# Patient Record
Sex: MALE
[De-identification: names, ages, dates, MRNs, and addresses within clinical notes are randomized per-mention and may not be internally consistent; named-entity substitution may affect disease eponyms.]

---

## 2018-12-10 ENCOUNTER — HOSPITAL ENCOUNTER (INPATIENT)
Dept: HOSPITAL 88 - FSED | Age: 78
LOS: 7 days | Discharge: HOME HEALTH | DRG: 292 | End: 2018-12-17
Attending: INTERNAL MEDICINE | Admitting: INTERNAL MEDICINE
Payer: MEDICARE

## 2018-12-10 VITALS — WEIGHT: 173 LBS | BODY MASS INDEX: 33.96 KG/M2 | HEIGHT: 60 IN

## 2018-12-10 VITALS — DIASTOLIC BLOOD PRESSURE: 88 MMHG | SYSTOLIC BLOOD PRESSURE: 136 MMHG

## 2018-12-10 VITALS — SYSTOLIC BLOOD PRESSURE: 136 MMHG | DIASTOLIC BLOOD PRESSURE: 88 MMHG

## 2018-12-10 VITALS — DIASTOLIC BLOOD PRESSURE: 65 MMHG | SYSTOLIC BLOOD PRESSURE: 136 MMHG

## 2018-12-10 DIAGNOSIS — K59.00: ICD-10-CM

## 2018-12-10 DIAGNOSIS — Z95.810: ICD-10-CM

## 2018-12-10 DIAGNOSIS — E11.9: ICD-10-CM

## 2018-12-10 DIAGNOSIS — Z79.01: ICD-10-CM

## 2018-12-10 DIAGNOSIS — I48.2: ICD-10-CM

## 2018-12-10 DIAGNOSIS — I48.91: ICD-10-CM

## 2018-12-10 DIAGNOSIS — I50.23: ICD-10-CM

## 2018-12-10 DIAGNOSIS — J44.1: ICD-10-CM

## 2018-12-10 DIAGNOSIS — J96.11: ICD-10-CM

## 2018-12-10 DIAGNOSIS — J11.1: ICD-10-CM

## 2018-12-10 DIAGNOSIS — D50.9: ICD-10-CM

## 2018-12-10 DIAGNOSIS — Z87.891: ICD-10-CM

## 2018-12-10 DIAGNOSIS — E87.6: ICD-10-CM

## 2018-12-10 DIAGNOSIS — Z91.14: ICD-10-CM

## 2018-12-10 DIAGNOSIS — I11.0: Primary | ICD-10-CM

## 2018-12-10 DIAGNOSIS — F10.21: ICD-10-CM

## 2018-12-10 DIAGNOSIS — N40.0: ICD-10-CM

## 2018-12-10 LAB
CK MB SERPL-MCNC: 4.6 NG/ML (ref 0–5)
CK SERPL-CCNC: 109 IU/L (ref 30–200)

## 2018-12-10 PROCEDURE — 93306 TTE W/DOPPLER COMPLETE: CPT

## 2018-12-10 PROCEDURE — 83540 ASSAY OF IRON: CPT

## 2018-12-10 PROCEDURE — 84466 ASSAY OF TRANSFERRIN: CPT

## 2018-12-10 PROCEDURE — 82553 CREATINE MB FRACTION: CPT

## 2018-12-10 PROCEDURE — 71046 X-RAY EXAM CHEST 2 VIEWS: CPT

## 2018-12-10 PROCEDURE — 94760 N-INVAS EAR/PLS OXIMETRY 1: CPT

## 2018-12-10 PROCEDURE — 84484 ASSAY OF TROPONIN QUANT: CPT

## 2018-12-10 PROCEDURE — 80076 HEPATIC FUNCTION PANEL: CPT

## 2018-12-10 PROCEDURE — 93005 ELECTROCARDIOGRAM TRACING: CPT

## 2018-12-10 PROCEDURE — 87400 INFLUENZA A/B EACH AG IA: CPT

## 2018-12-10 PROCEDURE — 36415 COLL VENOUS BLD VENIPUNCTURE: CPT

## 2018-12-10 PROCEDURE — 85379 FIBRIN DEGRADATION QUANT: CPT

## 2018-12-10 PROCEDURE — 80061 LIPID PANEL: CPT

## 2018-12-10 PROCEDURE — 80048 BASIC METABOLIC PNL TOTAL CA: CPT

## 2018-12-10 PROCEDURE — 83880 ASSAY OF NATRIURETIC PEPTIDE: CPT

## 2018-12-10 PROCEDURE — 80053 COMPREHEN METABOLIC PANEL: CPT

## 2018-12-10 PROCEDURE — 83735 ASSAY OF MAGNESIUM: CPT

## 2018-12-10 PROCEDURE — 83036 HEMOGLOBIN GLYCOSYLATED A1C: CPT

## 2018-12-10 PROCEDURE — 90732 PPSV23 VACC 2 YRS+ SUBQ/IM: CPT

## 2018-12-10 PROCEDURE — 82948 REAGENT STRIP/BLOOD GLUCOSE: CPT

## 2018-12-10 PROCEDURE — 85025 COMPLETE CBC W/AUTO DIFF WBC: CPT

## 2018-12-10 PROCEDURE — 71045 X-RAY EXAM CHEST 1 VIEW: CPT

## 2018-12-10 PROCEDURE — 82550 ASSAY OF CK (CPK): CPT

## 2018-12-10 PROCEDURE — 99284 EMERGENCY DEPT VISIT MOD MDM: CPT

## 2018-12-10 NOTE — XMS REPORT
Clinical Summary

                             Created on: 12/10/2018



Jerry Neves

External Reference #: [id number]

: 1940

Sex: Male



Demographics







                          Address                   [address]

[place]  [address]

 

                          Home Phone                [phone]

 

                          Preferred Language        English

 

                          Marital Status            Unknown

 

                          Buddhism Affiliation     Alevism

 

                          Race                      Unknown

 

                          Ethnic Group              /Latin





Author







                          Author                    [facility]

 

                          Address                   Unknown

 

                          Phone                     Unavailable







Support







                Name            Relationship    Address         Phone

 

                Giovana,Prima    ECON            [place],       [phone]







Care Team Providers







                    Care Team Member Name    Role                Phone

 

                          PCP                       Unavailable







Allergies

No Known Allergies



Medications







                          End Date                  Status



              Medication     Sig          Dispensed     Refills      Start  



                                         Date  

 

                                                    Active



                     atorvastatin (LIPITOR) 20     Take 20 mg by       0   



                           MG tablet                 mouth daily.     

 

                                                    Active



                     carvedilol (COREG) 25 MG     Take 25 mg by       0   



                           tablet                    mouth 2 (two)     



                                         times daily     



                                         with     



                                         breakfast and     



                                         dinner.     

 

                                                    Active



                     amiodarone (PACERONE) 200     Take 100 mg         0   



                           MG tablet                 by mouth     



                                         daily .     

 

                                                    Active



                     ramipril (ALTACE) 2.5 MG     Take 10 mg by       0   



                           capsule                   mouth daily .     

 

                                                    Active



                     clopidogrel (PLAVIX) 75     Take 75 mg by       0   



                           mg tablet                 mouth daily.     

 

                                                    Active



                     gabapentin (NEURONTIN)     Take 300 mg         0   



                           300 MG capsule            by mouth     



                                         nightly.     

 

                                                    Active



                     metformin HCl (METFORMIN     Take 500 mg         0   



                           ORAL)                     by mouth     



                                         every     



                                         morning.     

 

                                                    Active



                     furosemide (LASIX) 20 MG     Take 20 mg by       0   



                           tablet                    mouth every     



                                         other day.     

 

                                                    Active



                     amLODIPine (NORVASC) 2.5     Take 2.5 mg         0   



                           MG tablet                 by mouth     



                                         daily.     

 

                                                    Active



                     tamsulosin (FLOMAX) 0.4     Take 0.4 mg         0   



                           mg Cp24 24 hr capsule     by mouth     



                                         nightly.     

 

                                                    Active



                     ergocalciferol (VITAMIN     Take 50,000         0   



                           D2) 50,000 unit capsule     Units by     



                                         mouth once a     



                                         week.     

 

                                                    Active



                     alendronate (FOSAMAX) 70     Take 70 mg by       0   



                           MG tablet                 mouth every 7     



                                         days Take in     



                                         the morning     



                                         with a full     



                                         glass of     



                                         water, on an     



                                         empty     



                                         stomach, and     



                                         do not take     



                                         anything else     



                                         by mouth or     



                                         lie down for     



                                         the next 30     



                                         min. .     

 

                                                    Active



                     BABY ASPIRIN ORAL     Take by mouth       0   



                                         daily.     

 

                          06/15/2018                Discontinued



                     cilostazol (PLETAL) 100     Take 100 mg         0   



                           MG tablet                 by mouth 2     



                                         (two) times     



                                         daily.     

 

                          06/15/2018                Discontinued



                     spironolactone      Take 25 mg by       0   



                           (ALDACTONE) 25 MG tablet     mouth daily.     

 

                          06/15/2018                Discontinued



                     warfarin (COUMADIN) 2 MG     Take 2 mg by        0   



                           tablet                    mouth daily.     







Active Problems







 



                           Problem                   Noted Date

 

 



                           Claudication              06/15/2018

 

 



                           Abnormal stress test      06/15/2018

 

 



                           PAD (peripheral artery disease)     06/15/2018

 

 



                           Pneumonia                 2013

 

 



                                         Hypertension 

 

 



                                         CHF (congestive heart failure) 

 

 



                                         Mixed hyperlipidemia 

 

 



                                         CAD (coronary artery disease) 

 

 



                                         PVD (peripheral vascular disease) 

 

 



                                         Diabetes mellitus 

 

 



                                         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 

 

 



                                         Overview:



                                         Updated by SNOMED/IMO







Encounters







                          Care Team                 Description



                     Date                Type                Specialty  

 

                                        



Santos Li MD                     L CATH & CORONARY ANGIOS



                           06/15/2018                Surgery   

 

                                        



Santos Li MD                     Claudication (HCC) (Primary Dx); 

Abnormal stress test; 

Coronary artery disease due to lipid rich plaque



                     06/15/2018          Hospital            Cardiology  



                           -                         Encounter   



                                         2018    



after 2017



Social History







                                        Date



                 Tobacco Use     Types           Packs/Day       Years Used 

 

                                        Quit: 2018



                           Former Smoker             0.25  

 

    



                                         Smokeless Tobacco: Never   



                                         Used   









                                        Tobacco Cessation: Ready to Quit: No; Counseling Given: Yes











   



                 Alcohol Use     Drinks/Week     oz/Week         Comments

 

   



                                         No   









 



                           Sex Assigned at Birth     Date Recorded

 

 



                                         Not on file 









                                        Industry



                           Job Start Date            Occupation 

 

                                        Not on file



                           Not on file               Not on file 









                                        Travel End



                           Travel History            Travel Start 

 





                                         No recent travel history available.







Last Filed Vital Signs







                                        Time Taken



                           Vital Sign                Reading 

 

                                        2018  7:20 AM CDT



                           Blood Pressure            141/63 

 

                                        2018  7:20 AM CDT



                           Pulse                     60 

 

                                        2018  7:20 AM CDT



                           Temperature               36.7 C (98 F) 

 

                                        2018  7:20 AM CDT



                           Respiratory Rate          20 

 

                                        2018  7:20 AM CDT



                           Oxygen Saturation         97% 

 

                                        -



                           Inhaled Oxygen            - 



                                         Concentration  

 

                                        2018  7:20 AM CDT



                           Weight                    77.6 kg (171 lb) 

 

                                        06/15/2018  7:00 AM CDT



                           Height                    172.7 cm (5' 8") 

 

                                        2018  7:20 AM CDT



                           Body Mass Index           26 







Plan of Treatment





Not on file



Implants







                    Device Identifier    Shelf Expiration Date    Model / Serial / Lot



                 Implanted       Type            Area            Manufactur   



                                         er   

 

                    [id number]      2019          [id number] /

 /

                                        [id number]



                 Synergy         Stents-Cor      N/A: Coronary     [place]   



                     Implanted: Qty: 1 on 06/15/2018 by     onary               Santos Jdae MD      

 

                    [id number]      2019          [id number] /

 /

                                        [id number]



                 Promus Premier     Stents-Cor      N/A: Coronary     [place]   



                     Implanted: Qty: 1 on 06/15/2018 by     Santos Mahoney MD      







Procedures







                                        Comments



                 Procedure Name     Priority        Date/Time       Associated Diagnosis 

 

                                         



                           VASCULAR DIAGRAM -SCAN      2018  



                                         12:00 PM CDT  

 

                                         



                           VASCULAR DIAGRAM -SCAN      2018  



                                         12:00 PM CDT  

 

                                         



                           CARDIAC CATH REPORT -      2018  



                           SCAN                      10:12 AM CDT  

 

                                         



                           RHYTHM STRIP - SCAN       2018  



                                         12:01 PM CDT  

 

                                        



Results for this procedure are in the results section.



                     CBC (HEMOGRAM ONLY)     Routine             2018  



                                         3:10 AM CDT  

 

                                        



Results for this procedure are in the results section.



                     BASIC METABOLIC PANEL (7)     Routine             2018  



                                         3:10 AM CDT  

 

                                        



Results for this procedure are in the results section.



                     POCT-ACT            Routine             06/15/2018  



                                         5:46 PM CDT  

 

                                        



Results for this procedure are in the results section.



                     POCT-ACT            Routine             06/15/2018  



                                         3:08 PM CDT  

 

                                        



Results for this procedure are in the results section.



                     POCT-ACT            Routine             06/15/2018  



                                         2:39 PM CDT  

 

                                        



Results for this procedure are in the results section.



                     POCT-ACT            Routine             06/15/2018  



                                         2:02 PM CDT  

 

                                         



                     ABD AO & LOWER EXT      06/15/2018          Peripheral vascular 



                     ANGIOS/ POSS PPI      1:22 PM CDT         disease (HCC) 



                                         CAD in native artery 

 

  



                                         Case Notes



                                         4CASE



                                         POP6  Left



                                         heart cath



                                         possiblelo



                                         wer



                                         extremity



                                         angio with



                                         possible



                                         ppi

 

                                         



                     L CATH & CORONARY ANGIOS      06/15/2018          Peripheral vascular 



                           1:22 PM CDT               disease (HCC) 



                                         CAD in native artery 

 

  



                                         Case Notes



                                         4CASE



                                         POP6  Left



                                         heart cath



                                         possiblelo



                                         wer



                                         extremity



                                         angio with



                                         possible



                                         ppi

 

                                        



Results for this procedure are in the results section.



                     POCT-GLUCOSE METER     Routine             06/15/2018  



                                         8:18 AM CDT  



after 2017



Results

* VASCULAR DIAGRAM -SCAN (2018 12:00 PM CDT)



Only the most recent of 2 results within the time period is included.





 



                           Narrative                 Performed At

 

 



                                         This result has an attachment that is not available. 





* CARDIAC CATH REPORT - SCAN (2018 10:12 AM CDT)





 



                           Narrative                 Performed At

 

 



                                         This result has an attachment that is not available. 





* RHYTHM STRIP - SCAN (2018 12:01 PM CDT)





 



                           Narrative                 Performed At

 

 



                                         This result has an attachment that is not available. 





* CBC (Hemogram only) (2018  3:10 AM CDT)





   



                 Component       Value           Ref Range       Performed At

 

   



                 WBC             16.8 (H)        3.5 - 10.5 K/L     [facility]

 

   



                 RBC             3.92 (L)        4.63 - 6.08 M/L     [facility]

 

   



                 Hemoglobin      12.4 (L)        13.7 - 17.5 GM/DL     [facility]

 

   



                 Hematocrit      37.7 (L)        40.1 - 51.0 %     [facility]

 

   



                 MCV             96.2 (H)        79.0 - 92.2 fL     [facility]

 

   



                 MCH             31.6            25.7 - 32.2 pg     [facility]

 

   



                 MCHC            32.9            32.3 - 36.5 GM/DL     [facility]

 

   



                 RDW             14.6 (H)        11.6 - 14.4 %     [facility]

 

   



                 Platelets       281             150 - 450 K/CU MM     [facility]

 

   



                 MPV             11.2            9.4 - 12.4 fL     [facility]

 

   



                 nRBC            0               0 - 0 /100 WBC     [facility]













                                         Specimen

 





                                         Blood









   



                 Performing Organization     Address         City/State/Zipcode     Phone Number

 

   



                 [facility]     [address] [place] [address] [phone]





                                         MED[facility] CENTER   





* Basic metabolic panel (2018  3:10 AM CDT)





   



                 Component       Value           Ref Range       Performed At

 

   



                 Sodium          139             136 - 145 meq/L     [facility]

 

   



                 Potassium       4.5             3.5 - 5.1 meq/L     [facility]

 

   



                 Chloride        108 (H)         98 - 107 meq/L     [facility]

 

   



                 CO2             19 (L)          22 - 29 meq/L     [facility]

 

   



                 BUN             23 (H)          7 - 21 mg/dL     [facility]

 

   



                 Creatinine      1.10            0.57 - 1.25 mg/dL     [facility]

 

   



                 Glucose         176 (H)         70 - 105 mg/dL     [facility]

 

   



                 Calcium         9.0             8.4 - 10.2 mg/dL     [facility]

 

   



                 EGFR            65Comment: ESTIMATED GFR IS     mL/min/1.73 sq m     [facility]



                           NOT AS ACCURATE AS CREATININE      [facility]



                                         CLEARANCE IN PREDICTING  



                                         GLOMERULAR FILTRATION RATE.  



                                         ESTIMATED GFR IS NOT  



                                         APPLICABLE FOR DIALYSIS  



                                         PATIENTS.  













                                         Specimen

 





                                         Blood









   



                 Performing Organization     Address         City[facility]     Phone Number

 

   



                 [place] [address]     [phone] [place] [address]   





* POC ACTIVATED CLOTTING TIME (06/15/2018  5:46 PM CDT)



Only the most recent of 4 results within the time period is included.





   



                 Component       Value           Ref Range       Performed At

 

   



                 Activated Clotting Time     164Comment: TESTED AT [facility]     sec             [address]













                                         Specimen

 





                                         Blood









   



                 Performing Organization     Address         City/[facility]     Phone Number

 

   



                 [address] [address]     [phone] [place] [address]   





* POC-Glucose meter (06/15/2018  8:18 AM CDT)





   



                 Component       Value           Ref Range       Performed At

 

   



                 POC-Glucose Meter     118 (H)Comment: TESTED AT     70 - 110 mg/dL     [address]



                                         [id number]  













                                         Specimen

 





                                         Blood









   



                 Performing Organization     Address         City/[facility] Cent[facility]     Phone Number

 

   



                 [address] [phone]   





after 2017



Insurance







     



            Payer      Benefit     Subscriber ID     Type       Phone      Address



                                         Plan /    



                                         Group    

 

     



                     [facility]   



                                         MEDIC[facility]    



                                         ADV    









     



            Guarantor Name     Account     Relation to     Date of     Phone      Billing Address



                     Type                Patient             Birth  

 

     



            Jerry Neves     Personal/F     Self       1940     [phone] [address]



                     [facility]               (Home)              [place] [address]







Advance Directives





For more information, please contact:



[address] [address]



[phone]









                          Date Inactivated          Comments



                           Code Status               Date Activated  

 

                          2018 12:37 PM         



                           Full Code                 6/15/2018  7:55 AM  









  



                           This code status was determined by:     Patient 









                                                     

 

                          7/15/2013  4:53 PM        All possible means of support, including: cardiac massage,

 mechanical ventilation, and defibrillation will be used to support life.





                           Code ONE                  2013  1:11 AM

## 2018-12-10 NOTE — XMS REPORT
Patient Summary Document

                             Created on: 12/10/2018



JANEY BUSBY

External Reference #: [id number]

: 1940

Sex: Male



Demographics







                          Address                   [address]

[place]  [address]

 

                          Home Phone                [phone]

 

                          Preferred Language        Unknown

 

                          Marital Status            Unknown

 

                          Judaism Affiliation     Unknown

 

                          Race                      Unknown

 

                          Ethnic Group              Unknown





Author







                          Author                    [facility]

 

                          [facility]

 

                          Address                   Unknown

 

                          Phone                     Unavailable







Support







                Name            Relationship    Address         Phone

 

                    SARAH BUSBY      PRS                 [address], [place]  [id number] [phone]

 

                    SARAH BUSBY      PRS                 [address], [place]  [address] [phone]







Care Team Providers







                    Care Team Member Name    Role                Phone

 

                    DANILO SPRAGUE    Unavailable         Unavailable







Payers







             Payer Name    Policy Type    Policy Number    Effective Date    Expiration Date







Problems

This patient has no known problems.



Allergies, Adverse Reactions, Alerts







          Allergy Name    Allergy Type    Status    Severity    Reaction(s)    Onset Date    Inactive 

Date                      Treating Clinician        Comments

 

        No Known Allergies    DA      Active    U               2018-10-17 00:00:00                     

 

        NO KNOWN CONTRAST MEDIA ALLERG    DA      Active    U               2005 00:00:00                     

 

        NO KNOWN OTHER ALLERGIES    DA      Active    U               2005 00:00:00                     

 

        No Known Drug Allergies    DA      Active    U               2005 00:00:00                     

 

        No Known Food Allergies    DA      Active    U               2005 00:00:00                     

 

        No Known Drug Intolerances    DA      Active    U               2004 00:00:00                     







Medications

This patient has no known medications.



Results







           Test Description    Test Time    Test Comments    Text Results    Atomic Results    Result

 Comments

 

                BASIC METABOLIC PANEL    2018 03:57:00                      

 

   

 

                SODIUM (BEAKER) (test code=381)    139 meq/L       136-145          

 

                POTASSIUM (BEAKER) (test code=379)    4.5 meq/L       3.5-5.1          

 

                CHLORIDE (BEAKER) (test code=382)    108 meq/L                  

 

                CO2 (BEAKER) (test code=355)    19 meq/L        22-29            

 

                BLOOD UREA NITROGEN (BEAKER) (test code=354)    23 mg/dL        7-21             

 

                CREATININE (BEAKER) (test code=358)    1.10 mg/dL      0.57-1.25        

 

                GLUCOSE RANDOM (BEAKER) (test code=652)    176 mg/dL                  

 

                CALCIUM (BEAKER) (test code=697)    9.0 mg/dL       8.4-10.2         

 

                EGFR (BEAKER) (test code=1092)    65 mL/min/1.73 sq m                    ESTIMATED GFR IS NOT AS 

ACCURATE AS CREATININE CLEARANCE IN PREDICTING GLOMERULAR FILTRATION RATE. 
ESTIMATED GFR IS NOT APPLICABLE FOR DIALYSIS PATIENTS.





CBC (HEMOGRAM ONLY)2018 03:41:00* 





                Test Item       Value           Reference Range    Comments

 

                WHITE BLOOD CELL COUNT (BEAKER) (test code=775)    16.8 K/ L       3.5-10.5         

 

                RED BLOOD CELL COUNT (BEAKER) (test code=761)    3.92 M/ L       4.63-6.08        

 

                HEMOGLOBIN (BEAKER) (test code=410)    12.4 GM/DL      13.7-17.5        

 

                HEMATOCRIT (BEAKER) (test code=411)    37.7 %          40.1-51.0        

 

                MEAN CORPUSCULAR VOLUME (BEAKER) (test code=753)    96.2 fL         79.0-92.2        

 

                MEAN CORPUSCULAR HEMOGLOBIN (BEAKER) (test code=751)    31.6 pg         25.7-32.2        

 

                    MEAN CORPUSCULAR HEMOGLOBIN CONC (BEAKER) (test code=752)    32.9 GM/DL          32.3-36.5

                                         

 

                RED CELL DISTRIBUTION WIDTH (BEAKER) (test code=412)    14.6 %          11.6-14.4        

 

                PLATELET COUNT (BEAKER) (test code=756)    281 K/CU MM     150-450          

 

                MEAN PLATELET VOLUME (BEAKER) (test code=754)    11.2 fL         9.4-12.4         

 

                NUCLEATED RED BLOOD CELLS (BEAKER) (test code=413)    0 /100 WBC      0-0              





[id number] 17:52:00* 





                Test Item       Value           Reference Range    Comments

 

                ACTIVATED CLOTTING TIME (BEAKER) (test code=441)    164 sec                         TESTED AT [place] [id number]

 [facility] [address]





[id number] 15:26:00* 





                Test Item       Value           Reference Range    Comments

 

                ACTIVATED CLOTTING TIME (BEAKER) (test code=441)    235 sec                         TESTED AT [address] [address]





[id number] 15:26:00* 





                Test Item       Value           Reference Range    Comments

 

                ACTIVATED CLOTTING TIME (BEAKER) (test code=441)    213 sec                         TESTED AT [address] [address]





[id number] 15:25:00* 





                Test Item       Value           Reference Range    Comments

 

                ACTIVATED CLOTTING TIME (BEAKER) (test code=441)    279 sec                         TESTED AT [address] [address]





[id number]-[id number] METER2018-06-15 08:20:00* 





                Test Item       Value           Reference Range    Comments

 

                POC-GLUCOSE METER (AKER) (test code=1538)    118 mg/dL                 TESTED AT [address] [address]

## 2018-12-10 NOTE — XMS REPORT
Clinical Summary

                             Created on: 12/10/2018



Jerry Neves

External Reference #: [id number]

: 1940

Sex: Male



Demographics







                          Address                   [address]

[place]  [address]

 

                          Home Phone                [phone]

 

                          Preferred Language        English

 

                          Marital Status            Unknown

 

                          Moravian Affiliation     Adventism

 

                          Race                      Unknown

 

                          Ethnic Group              /Latin





Author







                          Author                    [facility]

 

                          Address                   Unknown

 

                          Phone                     Unavailable







Support







                Name            Relationship    Address         Phone

 

                Giovana,Prima    [employer]            [place],       [phone]







Care Team Providers







                    Care Team Member Name    Role                Phone

 

                          PCP                       Unavailable







Allergies

No Known Allergies



Medications







                          End Date                  Status



              Medication     Sig          Dispensed     Refills      Start  



                                         Date  

 

                                                    Active



                     atorvastatin (LIPITOR) 20     Take 20 mg by       0   



                           MG tablet                 mouth daily.     

 

                                                    Active



                     carvedilol (COREG) 25 MG     Take 25 mg by       0   



                           tablet                    mouth 2 (two)     



                                         times daily     



                                         with     



                                         breakfast and     



                                         dinner.     

 

                                                    Active



                     amiodarone (PACERONE) 200     Take 100 mg         0   



                           MG tablet                 by mouth     



                                         daily .     

 

                                                    Active



                     ramipril (ALTACE) 2.5 MG     Take 10 mg by       0   



                           capsule                   mouth daily .     

 

                                                    Active



                     clopidogrel (PLAVIX) 75     Take 75 mg by       0   



                           mg tablet                 mouth daily.     

 

                                                    Active



                     gabapentin (NEURONTIN)     Take 300 mg         0   



                           300 MG capsule            by mouth     



                                         nightly.     

 

                                                    Active



                     metformin HCl (METFORMIN     Take 500 mg         0   



                           ORAL)                     by mouth     



                                         every     



                                         morning.     

 

                                                    Active



                     furosemide (LASIX) 20 MG     Take 20 mg by       0   



                           tablet                    mouth every     



                                         other day.     

 

                                                    Active



                     amLODIPine (NORVASC) 2.5     Take 2.5 mg         0   



                           MG tablet                 by mouth     



                                         daily.     

 

                                                    Active



                     tamsulosin (FLOMAX) 0.4     Take 0.4 mg         0   



                           mg Cp24 24 hr capsule     by mouth     



                                         nightly.     

 

                                                    Active



                     ergocalciferol (VITAMIN     Take 50,000         0   



                           D2) 50,000 unit capsule     Units by     



                                         mouth once a     



                                         week.     

 

                                                    Active



                     alendronate (FOSAMAX) 70     Take 70 mg by       0   



                           MG tablet                 mouth every 7     



                                         days Take in     



                                         the morning     



                                         with a full     



                                         glass of     



                                         water, on an     



                                         empty     



                                         stomach, and     



                                         do not take     



                                         anything else     



                                         by mouth or     



                                         lie down for     



                                         the next 30     



                                         min. .     

 

                                                    Active



                     BABY ASPIRIN ORAL     Take by mouth       0   



                                         daily.     

 

                          06/15/2018                Discontinued



                     cilostazol (PLETAL) 100     Take 100 mg         0   



                           MG tablet                 by mouth 2     



                                         (two) times     



                                         daily.     

 

                          06/15/2018                Discontinued



                     spironolactone      Take 25 mg by       0   



                           (ALDACTONE) 25 MG tablet     mouth daily.     

 

                          06/15/2018                Discontinued



                     warfarin (COUMADIN) 2 MG     Take 2 mg by        0   



                           tablet                    mouth daily.     







Active Problems







 



                           Problem                   Noted Date

 

 



                           Claudication              06/15/2018

 

 



                           Abnormal stress test      06/15/2018

 

 



                           PAD (peripheral artery disease)     06/15/2018

 

 



                           Pneumonia                 2013

 

 



                                         Hypertension 

 

 



                                         CHF (congestive heart failure) 

 

 



                                         Mixed hyperlipidemia 

 

 



                                         CAD (coronary artery disease) 

 

 



                                         PVD (peripheral vascular disease) 

 

 



                                         Diabetes mellitus 

 

 



                                         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 

 

 



                                         Overview:



                                         Updated by SNOMED/IMO







Encounters







                          Care Team                 Description



                     Date                Type                Specialty  

 

                                        



Santos Li MD                     L CATH & CORONARY ANGIOS



                           06/15/2018                Surgery   

 

                                        



Santos Li MD                     Claudication (HCC) (Primary Dx); 

Abnormal stress test; 

Coronary artery disease due to lipid rich plaque



                     06/15/2018          Hospital            Cardiology  



                           -                         Encounter   



                                         2018    



after 2017



Social History







                                        Date



                 Tobacco Use     Types           Packs/Day       Years Used 

 

                                        Quit: 2018



                           Former Smoker             0.25  

 

    



                                         Smokeless Tobacco: Never   



                                         Used   









                                        Tobacco Cessation: Ready to Quit: No; Counseling Given: Yes











   



                 Alcohol Use     Drinks/Week     oz/Week         Comments

 

   



                                         No   









 



                           Sex Assigned at Birth     Date Recorded

 

 



                                         Not on file 









                                        Industry



                           Job Start Date            Occupation 

 

                                        Not on file



                           Not on file               Not on file 









                                        Travel End



                           Travel History            Travel Start 

 





                                         No recent travel history available.







Last Filed Vital Signs







                                        Time Taken



                           Vital Sign                Reading 

 

                                        2018  7:20 AM CDT



                           Blood Pressure            141/63 

 

                                        2018  7:20 AM CDT



                           Pulse                     60 

 

                                        2018  7:20 AM CDT



                           Temperature               36.7 C (98 F) 

 

                                        2018  7:20 AM CDT



                           Respiratory Rate          20 

 

                                        2018  7:20 AM CDT



                           Oxygen Saturation         97% 

 

                                        -



                           Inhaled Oxygen            - 



                                         Concentration  

 

                                        2018  7:20 AM CDT



                           Weight                    77.6 kg (171 lb) 

 

                                        06/15/2018  7:00 AM CDT



                           Height                    172.7 cm (5' 8") 

 

                                        2018  7:20 AM CDT



                           Body Mass Index           26 







Plan of Treatment





Not on file



Implants







                    Device Identifier    Shelf Expiration Date    Model / Serial / Lot



                 Implanted       Type            Area            Manufactur   



                                         er   

 

                    [id number]      2019          [id number] /

 /

                                        [id number]



                 Synergy         Stents-Cor      N/A: Coronary     [place]   



                     Implanted: Qty: 1 on 06/15/2018 by     onary               Santos Jade MD      

 

                    [id number]      2019          [id number] /

 /

                                        [id number]



                 Promus Premier     Stents-Cor      N/A: Coronary     [place]   



                     Implanted: Qty: 1 on 06/15/2018 by     Santos Mahoney MD      







Procedures







                                        Comments



                 Procedure Name     Priority        Date/Time       Associated Diagnosis 

 

                                         



                           VASCULAR DIAGRAM -SCAN      2018  



                                         12:00 PM CDT  

 

                                         



                           VASCULAR DIAGRAM -SCAN      2018  



                                         12:00 PM CDT  

 

                                         



                           CARDIAC CATH REPORT -      2018  



                           SCAN                      10:12 AM CDT  

 

                                         



                           RHYTHM STRIP - SCAN       2018  



                                         12:01 PM CDT  

 

                                        



Results for this procedure are in the results section.



                     CBC (HEMOGRAM ONLY)     Routine             2018  



                                         3:10 AM CDT  

 

                                        



Results for this procedure are in the results section.



                     BASIC METABOLIC PANEL (7)     Routine             2018  



                                         3:10 AM CDT  

 

                                        



Results for this procedure are in the results section.



                     POCT-ACT            Routine             06/15/2018  



                                         5:46 PM CDT  

 

                                        



Results for this procedure are in the results section.



                     POCT-ACT            Routine             06/15/2018  



                                         3:08 PM CDT  

 

                                        



Results for this procedure are in the results section.



                     POCT-ACT            Routine             06/15/2018  



                                         2:39 PM CDT  

 

                                        



Results for this procedure are in the results section.



                     POCT-ACT            Routine             06/15/2018  



                                         2:02 PM CDT  

 

                                         



                     ABD AO & LOWER EXT      06/15/2018          Peripheral vascular 



                     ANGIOS/ POSS PPI      1:22 PM CDT         disease (HCC) 



                                         CAD in native artery 

 

  



                                         Case Notes



                                         4CASE



                                         POP6  Left



                                         heart cath



                                         possiblelo



                                         wer



                                         extremity



                                         angio with



                                         possible



                                         ppi

 

                                         



                     L CATH & CORONARY ANGIOS      06/15/2018          Peripheral vascular 



                           1:22 PM CDT               disease (HCC) 



                                         CAD in native artery 

 

  



                                         Case Notes



                                         4CASE



                                         POP6  Left



                                         heart cath



                                         possiblelo



                                         wer



                                         extremity



                                         angio with



                                         possible



                                         ppi

 

                                        



Results for this procedure are in the results section.



                     POCT-GLUCOSE METER     Routine             06/15/2018  



                                         8:18 AM CDT  



after 2017



Results

* VASCULAR DIAGRAM -SCAN (2018 12:00 PM CDT)



Only the most recent of 2 results within the time period is included.





 



                           Narrative                 Performed At

 

 



                                         This result has an attachment that is not available. 





* CARDIAC CATH REPORT - SCAN (2018 10:12 AM CDT)





 



                           Narrative                 Performed At

 

 



                                         This result has an attachment that is not available. 





* RHYTHM STRIP - SCAN (2018 12:01 PM CDT)





 



                           Narrative                 Performed At

 

 



                                         This result has an attachment that is not available. 





* CBC (Hemogram only) (2018  3:10 AM CDT)





   



                 Component       Value           Ref Range       Performed At

 

   



                 WBC             16.8 (H)        3.5 - 10.5 K/L     [facility]

 

   



                 RBC             3.92 (L)        4.63 - 6.08 M/L     [facility]

 

   



                 Hemoglobin      12.4 (L)        13.7 - 17.5 GM/DL     [facility]

 

   



                 Hematocrit      37.7 (L)        40.1 - 51.0 %     [facility]

 

   



                 MCV             96.2 (H)        79.0 - 92.2 fL     [facility]

 

   



                 MCH             31.6            25.7 - 32.2 pg     [facility]

 

   



                 MCHC            32.9            32.3 - 36.5 GM/DL     [facility]

 

   



                 RDW             14.6 (H)        11.6 - 14.4 %     [facility]

 

   



                 Platelets       281             150 - 450 K/CU MM     [facility]

 

   



                 MPV             11.2            9.4 - 12.4 fL     [facility]

 

   



                 nRBC            0               0 - 0 /100 WBC     [facility]













                                         Specimen

 





                                         Blood









   



                 Performing Organization     Address         City/State/Zipcode     Phone Number

 

   



                 [facility]     [address] [place] [address] [phone]





                                         [facility]   





* Basic metabolic panel (2018  3:10 AM CDT)





   



                 Component       Value           Ref Range       Performed At

 

   



                 Sodium          139             136 - 145 meq/L     [facility]

 

   



                 Potassium       4.5             3.5 - 5.1 meq/L     [facility]

 

   



                 Chloride        108 (H)         98 - 107 meq/L     [facility]

 

   



                 CO2             19 (L)          22 - 29 meq/L     [facility]

 

   



                 BUN             23 (H)          7 - 21 mg/dL     [facility]

 

   



                 Creatinine      1.10            0.57 - 1.25 mg/dL     [facility]

 

   



                 Glucose         176 (H)         70 - 105 mg/dL     [facility]

 

   



                 Calcium         9.0             8.4 - 10.2 mg/dL     [facility]

 

   



                 EGFR            65Comment: ESTIMATED GFR IS     mL/min/1.73 sq m     [facility]



                           NOT AS ACCURATE AS CREATININE      [facility]



                                         CLEARANCE IN PREDICTING  



                                         GLOMERULAR FILTRATION RATE.  



                                         ESTIMATED GFR IS NOT  



                                         APPLICABLE FOR DIALYSIS  



                                         PATIENTS.  













                                         Specimen

 





                                         Blood









   



                 Performing Organization     Address         City[facility]     Phone Number

 

   



                 [place] [address]     [phone] [place] [address]   





* POC ACTIVATED CLOTTING TIME (06/15/2018  5:46 PM CDT)



Only the most recent of 4 results within the time period is included.





   



                 Component       Value           Ref Range       Performed At

 

   



                 Activated Clotting Time     164Comment: TESTED AT [facility]     sec             [address]













                                         Specimen

 





                                         Blood









   



                 Performing Organization     Address         City[facility]     Phone Number

 

   



                 [address] [address]     [phone] [place] [address]   





* POC-Glucose meter (06/15/2018  8:18 AM CDT)





   



                 Component       Value           Ref Range       Performed At

 

   



                 POC-Glucose Meter     118 (H)Comment: TESTED AT     70 - 110 mg/dL     [address]



                                         [id number]  













                                         Specimen

 





                                         Blood









   



                 Performing Organization     Address         City[facility]     Phone Number

 

   



                 [address] [phone]   





after 2017



Insurance







     



            Payer      Benefit     Subscriber ID     Type       Phone      Address



                                         Plan /    



                                         Group    

 

     



                     [facility]          xxxxxxxxxxx   



                                         MEDICARE    



                                         ADV    









     



            Guarantor Name     Account     Relation to     Date of     Phone      Billing Address



                     Type                Patient             Birth  

 

     



            Jerry Neves     Personal/F     Self       1940     [phone] [address]



                     [facility]               (Home)              [place] [address]







Advance Directives





For more information, please contact:



[address] [address]



[phone]









                          Date Inactivated          Comments



                           Code Status               Date Activated  

 

                          2018 12:37 PM         



                           Full Code                 6/15/2018  7:55 AM  









  



                           This code status was determined by:     Patient 









                                                     

 

                          7/15/2013  4:53 PM        All possible means of support, including: cardiac massage,

 mechanical ventilation, and defibrillation will be used to support life.





                           Code ONE                  2013  1:11 AM

## 2018-12-10 NOTE — DIAGNOSTIC IMAGING REPORT
EXAMINATION: PA and lateral views of the chest.



COMPARISON: None



CLINICAL HISTORY:  shortness of breath

     

DISCUSSION:



Lines/tubes:  Sternotomy wires. 3-lead cardiac device/ICD.



Lungs:  Pulmonary edema.



Pleura:  Small effusions.



Heart and mediastinum:  Heart is enlarged.



Bones and soft tissues:  No acute bony abnormalities.     



IMPRESSION: 



Cardiomegaly with pulmonary edema







Signed by: Dr. Andrew Palisch, M.D. on 12/10/2018 3:28 PM

## 2018-12-11 VITALS — SYSTOLIC BLOOD PRESSURE: 136 MMHG | DIASTOLIC BLOOD PRESSURE: 67 MMHG

## 2018-12-11 VITALS — SYSTOLIC BLOOD PRESSURE: 174 MMHG | DIASTOLIC BLOOD PRESSURE: 78 MMHG

## 2018-12-11 VITALS — DIASTOLIC BLOOD PRESSURE: 75 MMHG | SYSTOLIC BLOOD PRESSURE: 167 MMHG

## 2018-12-11 VITALS — SYSTOLIC BLOOD PRESSURE: 143 MMHG | DIASTOLIC BLOOD PRESSURE: 63 MMHG

## 2018-12-11 VITALS — SYSTOLIC BLOOD PRESSURE: 122 MMHG | DIASTOLIC BLOOD PRESSURE: 55 MMHG

## 2018-12-11 VITALS — SYSTOLIC BLOOD PRESSURE: 142 MMHG | DIASTOLIC BLOOD PRESSURE: 64 MMHG

## 2018-12-11 VITALS — SYSTOLIC BLOOD PRESSURE: 155 MMHG | DIASTOLIC BLOOD PRESSURE: 73 MMHG

## 2018-12-11 LAB
ALBUMIN SERPL-MCNC: 3.7 G/DL (ref 3.5–5)
ALBUMIN/GLOB SERPL: 1 {RATIO} (ref 0.8–2)
ALP SERPL-CCNC: 118 IU/L (ref 40–150)
ALT SERPL-CCNC: 9 IU/L (ref 0–55)
ANION GAP SERPL CALC-SCNC: 15.8 MMOL/L (ref 8–16)
BASOPHILS # BLD AUTO: 0.1 10*3/UL (ref 0–0.1)
BASOPHILS NFR BLD AUTO: 0.9 % (ref 0–1)
BUN SERPL-MCNC: 16 MG/DL (ref 7–26)
BUN/CREAT SERPL: 13 (ref 6–25)
CALCIUM SERPL-MCNC: 9.3 MG/DL (ref 8.4–10.2)
CHLORIDE SERPL-SCNC: 100 MMOL/L (ref 98–107)
CO2 SERPL-SCNC: 24 MMOL/L (ref 22–29)
DEPRECATED NEUTROPHILS # BLD AUTO: 9.9 10*3/UL (ref 2.1–6.9)
EGFRCR SERPLBLD CKD-EPI 2021: 57 ML/MIN (ref 60–?)
EOSINOPHIL # BLD AUTO: 0.1 10*3/UL (ref 0–0.4)
EOSINOPHIL NFR BLD AUTO: 0.5 % (ref 0–6)
ERYTHROCYTE [DISTWIDTH] IN CORD BLOOD: 17.6 % (ref 11.7–14.4)
GLOBULIN PLAS-MCNC: 3.7 G/DL (ref 2.3–3.5)
GLUCOSE SERPLBLD-MCNC: 138 MG/DL (ref 74–118)
HCT VFR BLD AUTO: 31 % (ref 38.2–49.6)
HGB BLD-MCNC: 9.2 G/DL (ref 14–18)
LYMPHOCYTES # BLD: 1.4 10*3/UL (ref 1–3.2)
LYMPHOCYTES NFR BLD AUTO: 11 % (ref 18–39.1)
MCH RBC QN AUTO: 22 PG (ref 28–32)
MCHC RBC AUTO-ENTMCNC: 29.7 G/DL (ref 31–35)
MCV RBC AUTO: 74 FL (ref 81–99)
MONOCYTES # BLD AUTO: 1.4 10*3/UL (ref 0.2–0.8)
MONOCYTES NFR BLD AUTO: 10.5 % (ref 4.4–11.3)
NEUTS SEG NFR BLD AUTO: 76.6 % (ref 38.7–80)
PLATELET # BLD AUTO: 411 X10E3/UL (ref 140–360)
POTASSIUM SERPL-SCNC: 3.8 MMOL/L (ref 3.5–5.1)
RBC # BLD AUTO: 4.19 X10E6/UL (ref 4.3–5.7)
SODIUM SERPL-SCNC: 136 MMOL/L (ref 136–145)

## 2018-12-11 RX ADMIN — HEPARIN SODIUM SCH UNIT: 5000 INJECTION INTRAVENOUS; SUBCUTANEOUS at 21:58

## 2018-12-11 RX ADMIN — APIXABAN SCH MG: 5 TABLET, FILM COATED ORAL at 16:58

## 2018-12-11 RX ADMIN — CARVEDILOL SCH MG: 12.5 TABLET, FILM COATED ORAL at 21:00

## 2018-12-11 RX ADMIN — TAMSULOSIN HYDROCHLORIDE SCH MG: 0.4 CAPSULE ORAL at 21:57

## 2018-12-11 RX ADMIN — ASPIRIN 81 MG CHEWABLE TABLET SCH MG: 81 TABLET CHEWABLE at 13:16

## 2018-12-11 RX ADMIN — PANTOPRAZOLE SODIUM SCH MG: 40 TABLET, DELAYED RELEASE ORAL at 11:10

## 2018-12-11 RX ADMIN — CARVEDILOL SCH MG: 3.12 TABLET, FILM COATED ORAL at 21:57

## 2018-12-11 RX ADMIN — CARVEDILOL SCH MG: 3.12 TABLET, FILM COATED ORAL at 11:10

## 2018-12-11 RX ADMIN — FUROSEMIDE SCH MG: 10 INJECTION, SOLUTION INTRAMUSCULAR; INTRAVENOUS at 21:57

## 2018-12-11 RX ADMIN — FUROSEMIDE SCH MG: 10 INJECTION, SOLUTION INTRAMUSCULAR; INTRAVENOUS at 11:10

## 2018-12-11 NOTE — DIAGNOSTIC IMAGING REPORT
PROCEDURE:

A single AP view of the chest.

 

COMPARISON: 1210 2018.

 

INDICATIONS:   CHF

     

FINDINGS:

Stable enlargement of the cardiac silhouette with postsurgical changes 

of the mediastinum. Prominence of the pulmonary interstitium, and small 

bilateral pleural effusions left greater than right are also unchanged.

 

No acute osseous abnormality.

 

 

IMPRESSION: 

 

Stable cardiomegaly with interstitial pulmonary edema and small 

bilateral pleural effusions. 

Dictated by:  Mat Brown M.D. on 12/11/2018 at 10:19     

Electronically approved by:  Mat Brown M.D. on 12/11/2018 at 10:19

## 2018-12-12 VITALS — DIASTOLIC BLOOD PRESSURE: 59 MMHG | SYSTOLIC BLOOD PRESSURE: 138 MMHG

## 2018-12-12 VITALS — DIASTOLIC BLOOD PRESSURE: 79 MMHG | SYSTOLIC BLOOD PRESSURE: 136 MMHG

## 2018-12-12 VITALS — SYSTOLIC BLOOD PRESSURE: 124 MMHG | DIASTOLIC BLOOD PRESSURE: 58 MMHG

## 2018-12-12 VITALS — SYSTOLIC BLOOD PRESSURE: 132 MMHG | DIASTOLIC BLOOD PRESSURE: 60 MMHG

## 2018-12-12 VITALS — SYSTOLIC BLOOD PRESSURE: 148 MMHG | DIASTOLIC BLOOD PRESSURE: 67 MMHG

## 2018-12-12 LAB
ALBUMIN SERPL-MCNC: 3.4 G/DL (ref 3.5–5)
ALP SERPL-CCNC: 105 IU/L (ref 40–150)
ALT SERPL-CCNC: 9 IU/L (ref 0–55)
ANION GAP SERPL CALC-SCNC: 16.5 MMOL/L (ref 8–16)
BASOPHILS # BLD AUTO: 0.1 10*3/UL (ref 0–0.1)
BASOPHILS NFR BLD AUTO: 0.8 % (ref 0–1)
BILIRUB CONJ SERPL-MCNC: 0.2 MG/DL (ref 0–0.5)
BUN SERPL-MCNC: 24 MG/DL (ref 7–26)
BUN/CREAT SERPL: 19 (ref 6–25)
CALCIUM SERPL-MCNC: 9.1 MG/DL (ref 8.4–10.2)
CHLORIDE SERPL-SCNC: 97 MMOL/L (ref 98–107)
CHOLEST SERPL-MCNC: 130 MD/DL (ref 0–199)
CHOLEST/HDLC SERPL: 3.9 {RATIO} (ref 3.9–4.7)
CO2 SERPL-SCNC: 24 MMOL/L (ref 22–29)
DEPRECATED NEUTROPHILS # BLD AUTO: 7.9 10*3/UL (ref 2.1–6.9)
EGFRCR SERPLBLD CKD-EPI 2021: 56 ML/MIN (ref 60–?)
EOSINOPHIL # BLD AUTO: 0.2 10*3/UL (ref 0–0.4)
EOSINOPHIL NFR BLD AUTO: 1.7 % (ref 0–6)
ERYTHROCYTE [DISTWIDTH] IN CORD BLOOD: 17.6 % (ref 11.7–14.4)
GLUCOSE SERPLBLD-MCNC: 120 MG/DL (ref 74–118)
HCT VFR BLD AUTO: 27.8 % (ref 38.2–49.6)
HDLC SERPL-MSCNC: 33 MG/DL (ref 40–60)
HGB BLD-MCNC: 8.3 G/DL (ref 14–18)
IRON SATN MFR SERPL: 1 % (ref 15–50)
IRON SERPL-MCNC: 7 UG/DL (ref 65–175)
LDLC SERPL CALC-MCNC: 74 MG/DL (ref 60–130)
LYMPHOCYTES # BLD: 1.5 10*3/UL (ref 1–3.2)
LYMPHOCYTES NFR BLD AUTO: 13.7 % (ref 18–39.1)
MAGNESIUM SERPL-MCNC: 2 MG/DL (ref 1.3–2.1)
MCH RBC QN AUTO: 22 PG (ref 28–32)
MCHC RBC AUTO-ENTMCNC: 29.9 G/DL (ref 31–35)
MCV RBC AUTO: 73.7 FL (ref 81–99)
MONOCYTES # BLD AUTO: 1.4 10*3/UL (ref 0.2–0.8)
MONOCYTES NFR BLD AUTO: 12.1 % (ref 4.4–11.3)
NEUTS SEG NFR BLD AUTO: 71.3 % (ref 38.7–80)
PLATELET # BLD AUTO: 354 X10E3/UL (ref 140–360)
POTASSIUM SERPL-SCNC: 3.5 MMOL/L (ref 3.5–5.1)
RBC # BLD AUTO: 3.77 X10E6/UL (ref 4.3–5.7)
SODIUM SERPL-SCNC: 134 MMOL/L (ref 136–145)
TIBC SERPL-MCNC: 487 UG/DL (ref 261–478)
TRANSFERRIN SERPL-MCNC: 348 MG/DL (ref 174–364)
TRIGL SERPL-MCNC: 113 MG/DL (ref 0–149)

## 2018-12-12 RX ADMIN — APIXABAN SCH MG: 5 TABLET, FILM COATED ORAL at 17:07

## 2018-12-12 RX ADMIN — APIXABAN SCH MG: 5 TABLET, FILM COATED ORAL at 09:13

## 2018-12-12 RX ADMIN — PANTOPRAZOLE SODIUM SCH MG: 40 TABLET, DELAYED RELEASE ORAL at 09:13

## 2018-12-12 RX ADMIN — CARVEDILOL SCH MG: 3.12 TABLET, FILM COATED ORAL at 09:13

## 2018-12-12 RX ADMIN — FUROSEMIDE SCH MG: 10 INJECTION, SOLUTION INTRAMUSCULAR; INTRAVENOUS at 21:12

## 2018-12-12 RX ADMIN — ASPIRIN 81 MG CHEWABLE TABLET SCH MG: 81 TABLET CHEWABLE at 09:13

## 2018-12-12 RX ADMIN — FUROSEMIDE SCH MG: 10 INJECTION, SOLUTION INTRAMUSCULAR; INTRAVENOUS at 09:13

## 2018-12-12 RX ADMIN — HEPARIN SODIUM SCH UNIT: 5000 INJECTION INTRAVENOUS; SUBCUTANEOUS at 09:13

## 2018-12-12 RX ADMIN — SODIUM CHLORIDE SCH MLS/HR: 9 INJECTION, SOLUTION INTRAVENOUS at 12:47

## 2018-12-12 RX ADMIN — CARVEDILOL SCH MG: 12.5 TABLET, FILM COATED ORAL at 09:00

## 2018-12-12 RX ADMIN — CARVEDILOL SCH MG: 12.5 TABLET, FILM COATED ORAL at 21:12

## 2018-12-12 RX ADMIN — TAMSULOSIN HYDROCHLORIDE SCH MG: 0.4 CAPSULE ORAL at 21:12

## 2018-12-12 RX ADMIN — SPIRONOLACTONE SCH MG: 25 TABLET, FILM COATED ORAL at 12:47

## 2018-12-12 NOTE — DIAGNOSTIC IMAGING REPORT
EXAMINATION:  CHEST SINGLE (PORTABLE)    



INDICATION: CHF  



COMPARISON:  12/11/2018 and 12/10/2018

     

FINDINGS:  AP view   



TUBES and LINES:  Sternotomy wires. 3-lead cardiac device/ICD.



LUNGS:  Lungs are well inflated. Stable edema. Increased right infrahilar

opacity likely representing atelectasis. 



PLEURA:  Small effusions, stable. No pneumothorax.



HEART AND MEDIASTINUM: Stable cardiomegaly. CABG clips.



BONES AND SOFT TISSUES:  No acute osseous lesion.  Soft tissues are

unremarkable.



UPPER ABDOMEN: No free air under the diaphragm.    



IMPRESSION: 

Stable cardiomegaly with pulmonary edema.



Signed by: DR. Ken Vences MD on 12/12/2018 6:07 AM

## 2018-12-13 VITALS — DIASTOLIC BLOOD PRESSURE: 46 MMHG | SYSTOLIC BLOOD PRESSURE: 107 MMHG

## 2018-12-13 VITALS — SYSTOLIC BLOOD PRESSURE: 123 MMHG | DIASTOLIC BLOOD PRESSURE: 53 MMHG

## 2018-12-13 VITALS — SYSTOLIC BLOOD PRESSURE: 116 MMHG | DIASTOLIC BLOOD PRESSURE: 57 MMHG

## 2018-12-13 VITALS — SYSTOLIC BLOOD PRESSURE: 134 MMHG | DIASTOLIC BLOOD PRESSURE: 64 MMHG

## 2018-12-13 VITALS — SYSTOLIC BLOOD PRESSURE: 129 MMHG | DIASTOLIC BLOOD PRESSURE: 58 MMHG

## 2018-12-13 VITALS — DIASTOLIC BLOOD PRESSURE: 58 MMHG | SYSTOLIC BLOOD PRESSURE: 129 MMHG

## 2018-12-13 VITALS — DIASTOLIC BLOOD PRESSURE: 56 MMHG | SYSTOLIC BLOOD PRESSURE: 122 MMHG

## 2018-12-13 LAB
ANION GAP SERPL CALC-SCNC: 14.5 MMOL/L (ref 8–16)
ANISOCYTOSIS BLD QL SMEAR: (no result)
BASO STIPL BLD QL SMEAR: (no result)
BASOPHILS # BLD AUTO: 0.1 10*3/UL (ref 0–0.1)
BASOPHILS NFR BLD AUTO: 0.6 % (ref 0–1)
BUN SERPL-MCNC: 27 MG/DL (ref 7–26)
BUN/CREAT SERPL: 24 (ref 6–25)
CALCIUM SERPL-MCNC: 8.9 MG/DL (ref 8.4–10.2)
CHLORIDE SERPL-SCNC: 94 MMOL/L (ref 98–107)
CO2 SERPL-SCNC: 28 MMOL/L (ref 22–29)
DEPRECATED NEUTROPHILS # BLD AUTO: 10.3 10*3/UL (ref 2.1–6.9)
EGFRCR SERPLBLD CKD-EPI 2021: > 60 ML/MIN (ref 60–?)
EOSINOPHIL # BLD AUTO: 0.3 10*3/UL (ref 0–0.4)
EOSINOPHIL NFR BLD AUTO: 2.1 % (ref 0–6)
EOSINOPHIL NFR BLD MANUAL: 3 % (ref 0–7)
ERYTHROCYTE [DISTWIDTH] IN CORD BLOOD: 17.6 % (ref 11.7–14.4)
GLUCOSE SERPLBLD-MCNC: 124 MG/DL (ref 74–118)
HCT VFR BLD AUTO: 27 % (ref 38.2–49.6)
HGB BLD-MCNC: 8.1 G/DL (ref 14–18)
HYPOCHROMIA BLD QL SMEAR: (no result)
LYMPHOCYTES # BLD: 1.4 10*3/UL (ref 1–3.2)
LYMPHOCYTES NFR BLD AUTO: 10.4 % (ref 18–39.1)
LYMPHOCYTES NFR BLD MANUAL: 14 % (ref 19–48)
MACROCYTES BLD QL SMEAR: SLIGHT
MAGNESIUM SERPL-MCNC: 1.9 MG/DL (ref 1.3–2.1)
MCH RBC QN AUTO: 22.3 PG (ref 28–32)
MCHC RBC AUTO-ENTMCNC: 30 G/DL (ref 31–35)
MCV RBC AUTO: 74.2 FL (ref 81–99)
METAMYELOCYTES NFR BLD MANUAL: 1 % (ref 0–0)
MONOCYTES # BLD AUTO: 1.6 10*3/UL (ref 0.2–0.8)
MONOCYTES NFR BLD AUTO: 11.5 % (ref 4.4–11.3)
MONOCYTES NFR BLD MANUAL: 7 % (ref 3.4–9)
NEUTS SEG NFR BLD AUTO: 74.8 % (ref 38.7–80)
NEUTS SEG NFR BLD MANUAL: 75 % (ref 40–74)
NRBC/RBC NFR BLD MANUAL: 1 %
PLAT MORPH BLD: NORMAL
PLATELET # BLD AUTO: 329 X10E3/UL (ref 140–360)
PLATELET # BLD EST: ADEQUATE 10*3/UL
POIKILOCYTOSIS BLD QL SMEAR: SLIGHT
POTASSIUM SERPL-SCNC: 3.5 MMOL/L (ref 3.5–5.1)
RBC # BLD AUTO: 3.64 X10E6/UL (ref 4.3–5.7)
RBC MORPH BLD: (no result)
SODIUM SERPL-SCNC: 133 MMOL/L (ref 136–145)

## 2018-12-13 RX ADMIN — APIXABAN SCH MG: 5 TABLET, FILM COATED ORAL at 16:07

## 2018-12-13 RX ADMIN — CARVEDILOL SCH MG: 12.5 TABLET, FILM COATED ORAL at 20:49

## 2018-12-13 RX ADMIN — CARVEDILOL SCH MG: 12.5 TABLET, FILM COATED ORAL at 09:04

## 2018-12-13 RX ADMIN — SODIUM CHLORIDE SCH MLS/HR: 9 INJECTION, SOLUTION INTRAVENOUS at 12:00

## 2018-12-13 RX ADMIN — APIXABAN SCH MG: 5 TABLET, FILM COATED ORAL at 09:04

## 2018-12-13 RX ADMIN — ASPIRIN 81 MG CHEWABLE TABLET SCH MG: 81 TABLET CHEWABLE at 09:04

## 2018-12-13 RX ADMIN — FUROSEMIDE SCH MG: 10 INJECTION, SOLUTION INTRAMUSCULAR; INTRAVENOUS at 20:49

## 2018-12-13 RX ADMIN — FUROSEMIDE SCH MG: 10 INJECTION, SOLUTION INTRAMUSCULAR; INTRAVENOUS at 09:04

## 2018-12-13 RX ADMIN — PANTOPRAZOLE SODIUM SCH MG: 40 TABLET, DELAYED RELEASE ORAL at 09:04

## 2018-12-13 RX ADMIN — SPIRONOLACTONE SCH MG: 25 TABLET, FILM COATED ORAL at 09:04

## 2018-12-13 RX ADMIN — TAMSULOSIN HYDROCHLORIDE SCH MG: 0.4 CAPSULE ORAL at 20:49

## 2018-12-13 NOTE — DIAGNOSTIC IMAGING REPORT
EXAMINATION:  CHEST SINGLE (PORTABLE)    



INDICATION: CHF  



COMPARISON:  12/12/2018

     

FINDINGS:  AP view   



TUBES and LINES:  Sternotomy wires. 3-lead cardiac device/ICD.



LUNGS:  Lungs are well inflated. Stable edema. Stable right infrahilar opacity

likely representing atelectasis. 



PLEURA:  Small effusions, stable. No pneumothorax.



HEART AND MEDIASTINUM: Stable cardiomegaly. CABG clips.



BONES AND SOFT TISSUES:  No acute osseous lesion.  Soft tissues are

unremarkable.



UPPER ABDOMEN: No free air under the diaphragm.    



IMPRESSION: 

Stable cardiomegaly with pulmonary edema.



Signed by: DR. Ken Vences MD on 12/13/2018 6:30 AM

## 2018-12-14 VITALS — DIASTOLIC BLOOD PRESSURE: 62 MMHG | SYSTOLIC BLOOD PRESSURE: 136 MMHG

## 2018-12-14 VITALS — DIASTOLIC BLOOD PRESSURE: 68 MMHG | SYSTOLIC BLOOD PRESSURE: 129 MMHG

## 2018-12-14 VITALS — SYSTOLIC BLOOD PRESSURE: 109 MMHG | DIASTOLIC BLOOD PRESSURE: 75 MMHG

## 2018-12-14 VITALS — SYSTOLIC BLOOD PRESSURE: 121 MMHG | DIASTOLIC BLOOD PRESSURE: 58 MMHG

## 2018-12-14 VITALS — DIASTOLIC BLOOD PRESSURE: 60 MMHG | SYSTOLIC BLOOD PRESSURE: 135 MMHG

## 2018-12-14 VITALS — SYSTOLIC BLOOD PRESSURE: 136 MMHG | DIASTOLIC BLOOD PRESSURE: 62 MMHG

## 2018-12-14 VITALS — DIASTOLIC BLOOD PRESSURE: 86 MMHG | SYSTOLIC BLOOD PRESSURE: 179 MMHG

## 2018-12-14 VITALS — SYSTOLIC BLOOD PRESSURE: 137 MMHG | DIASTOLIC BLOOD PRESSURE: 60 MMHG

## 2018-12-14 LAB
ANION GAP SERPL CALC-SCNC: 12.8 MMOL/L (ref 8–16)
BASOPHILS # BLD AUTO: 0.1 10*3/UL (ref 0–0.1)
BASOPHILS NFR BLD AUTO: 0.6 % (ref 0–1)
BUN SERPL-MCNC: 27 MG/DL (ref 7–26)
BUN/CREAT SERPL: 21 (ref 6–25)
CALCIUM SERPL-MCNC: 9 MG/DL (ref 8.4–10.2)
CHLORIDE SERPL-SCNC: 96 MMOL/L (ref 98–107)
CO2 SERPL-SCNC: 30 MMOL/L (ref 22–29)
DEPRECATED NEUTROPHILS # BLD AUTO: 8.9 10*3/UL (ref 2.1–6.9)
EGFRCR SERPLBLD CKD-EPI 2021: 55 ML/MIN (ref 60–?)
EOSINOPHIL # BLD AUTO: 0.3 10*3/UL (ref 0–0.4)
EOSINOPHIL NFR BLD AUTO: 2.5 % (ref 0–6)
ERYTHROCYTE [DISTWIDTH] IN CORD BLOOD: 18.1 % (ref 11.7–14.4)
GLUCOSE SERPLBLD-MCNC: 120 MG/DL (ref 74–118)
HCT VFR BLD AUTO: 27.2 % (ref 38.2–49.6)
HGB BLD-MCNC: 8.1 G/DL (ref 14–18)
LYMPHOCYTES # BLD: 1.8 10*3/UL (ref 1–3.2)
LYMPHOCYTES NFR BLD AUTO: 14.5 % (ref 18–39.1)
MAGNESIUM SERPL-MCNC: 2.2 MG/DL (ref 1.3–2.1)
MCH RBC QN AUTO: 22.5 PG (ref 28–32)
MCHC RBC AUTO-ENTMCNC: 29.8 G/DL (ref 31–35)
MCV RBC AUTO: 75.6 FL (ref 81–99)
MONOCYTES # BLD AUTO: 1.4 10*3/UL (ref 0.2–0.8)
MONOCYTES NFR BLD AUTO: 11.4 % (ref 4.4–11.3)
NEUTS SEG NFR BLD AUTO: 70.3 % (ref 38.7–80)
PLATELET # BLD AUTO: 311 X10E3/UL (ref 140–360)
POTASSIUM SERPL-SCNC: 3.8 MMOL/L (ref 3.5–5.1)
RBC # BLD AUTO: 3.6 X10E6/UL (ref 4.3–5.7)
SODIUM SERPL-SCNC: 135 MMOL/L (ref 136–145)

## 2018-12-14 RX ADMIN — SODIUM CHLORIDE SCH MLS/HR: 9 INJECTION, SOLUTION INTRAVENOUS at 11:30

## 2018-12-14 RX ADMIN — APIXABAN SCH MG: 5 TABLET, FILM COATED ORAL at 08:00

## 2018-12-14 RX ADMIN — CARVEDILOL SCH MG: 12.5 TABLET, FILM COATED ORAL at 20:24

## 2018-12-14 RX ADMIN — ASPIRIN 81 MG CHEWABLE TABLET SCH MG: 81 TABLET CHEWABLE at 08:00

## 2018-12-14 RX ADMIN — FUROSEMIDE SCH MG: 10 INJECTION, SOLUTION INTRAMUSCULAR; INTRAVENOUS at 08:00

## 2018-12-14 RX ADMIN — SPIRONOLACTONE SCH MG: 25 TABLET, FILM COATED ORAL at 08:00

## 2018-12-14 RX ADMIN — APIXABAN SCH MG: 5 TABLET, FILM COATED ORAL at 18:08

## 2018-12-14 RX ADMIN — TAMSULOSIN HYDROCHLORIDE SCH MG: 0.4 CAPSULE ORAL at 20:24

## 2018-12-14 RX ADMIN — CARVEDILOL SCH MG: 12.5 TABLET, FILM COATED ORAL at 08:00

## 2018-12-14 RX ADMIN — PANTOPRAZOLE SODIUM SCH MG: 40 TABLET, DELAYED RELEASE ORAL at 08:00

## 2018-12-14 RX ADMIN — FUROSEMIDE SCH MG: 10 INJECTION, SOLUTION INTRAMUSCULAR; INTRAVENOUS at 20:24

## 2018-12-14 NOTE — DIAGNOSTIC IMAGING REPORT
EXAMINATION:  CHEST SINGLE (PORTABLE)    



INDICATION: CHF.  



COMPARISON:  12/13/2018

     

FINDINGS:  AP view   



TUBES and LINES:  Sternotomy wires. 3-lead cardiac device/ICD.



LUNGS:  Lungs are well inflated. Mildly decreased edema. 



PLEURA:  Small effusions, stable. No pneumothorax.



HEART AND MEDIASTINUM: Stable cardiomegaly. CABG clips.



BONES AND SOFT TISSUES:  No acute osseous lesion.  Soft tissues are

unremarkable.



UPPER ABDOMEN: No free air under the diaphragm.    



IMPRESSION: 

Mildly decreased pulmonary edema.



Signed by: DR. Ken Vences MD on 12/14/2018 5:33 AM

## 2018-12-15 VITALS — DIASTOLIC BLOOD PRESSURE: 59 MMHG | SYSTOLIC BLOOD PRESSURE: 132 MMHG

## 2018-12-15 VITALS — DIASTOLIC BLOOD PRESSURE: 58 MMHG | SYSTOLIC BLOOD PRESSURE: 126 MMHG

## 2018-12-15 VITALS — DIASTOLIC BLOOD PRESSURE: 57 MMHG | SYSTOLIC BLOOD PRESSURE: 121 MMHG

## 2018-12-15 VITALS — SYSTOLIC BLOOD PRESSURE: 126 MMHG | DIASTOLIC BLOOD PRESSURE: 58 MMHG

## 2018-12-15 VITALS — SYSTOLIC BLOOD PRESSURE: 121 MMHG | DIASTOLIC BLOOD PRESSURE: 57 MMHG

## 2018-12-15 VITALS — DIASTOLIC BLOOD PRESSURE: 87 MMHG | SYSTOLIC BLOOD PRESSURE: 130 MMHG

## 2018-12-15 VITALS — DIASTOLIC BLOOD PRESSURE: 59 MMHG | SYSTOLIC BLOOD PRESSURE: 120 MMHG

## 2018-12-15 VITALS — DIASTOLIC BLOOD PRESSURE: 58 MMHG | SYSTOLIC BLOOD PRESSURE: 124 MMHG

## 2018-12-15 LAB
ANION GAP SERPL CALC-SCNC: 13 MMOL/L (ref 8–16)
BUN SERPL-MCNC: 29 MG/DL (ref 7–26)
BUN/CREAT SERPL: 25 (ref 6–25)
CALCIUM SERPL-MCNC: 9.1 MG/DL (ref 8.4–10.2)
CHLORIDE SERPL-SCNC: 97 MMOL/L (ref 98–107)
CO2 SERPL-SCNC: 32 MMOL/L (ref 22–29)
EGFRCR SERPLBLD CKD-EPI 2021: 60 ML/MIN (ref 60–?)
GLUCOSE SERPLBLD-MCNC: 196 MG/DL (ref 74–118)
MAGNESIUM SERPL-MCNC: 2.1 MG/DL (ref 1.3–2.1)
POTASSIUM SERPL-SCNC: 4 MMOL/L (ref 3.5–5.1)
SODIUM SERPL-SCNC: 138 MMOL/L (ref 136–145)

## 2018-12-15 RX ADMIN — APIXABAN SCH MG: 5 TABLET, FILM COATED ORAL at 09:00

## 2018-12-15 RX ADMIN — FUROSEMIDE SCH MG: 10 INJECTION, SOLUTION INTRAMUSCULAR; INTRAVENOUS at 20:36

## 2018-12-15 RX ADMIN — SPIRONOLACTONE SCH MG: 25 TABLET, FILM COATED ORAL at 09:01

## 2018-12-15 RX ADMIN — APIXABAN SCH MG: 5 TABLET, FILM COATED ORAL at 16:48

## 2018-12-15 RX ADMIN — CARVEDILOL SCH MG: 12.5 TABLET, FILM COATED ORAL at 20:36

## 2018-12-15 RX ADMIN — PANTOPRAZOLE SODIUM SCH MG: 40 TABLET, DELAYED RELEASE ORAL at 20:36

## 2018-12-15 RX ADMIN — PANTOPRAZOLE SODIUM SCH MG: 40 TABLET, DELAYED RELEASE ORAL at 09:00

## 2018-12-15 RX ADMIN — FUROSEMIDE SCH MG: 10 INJECTION, SOLUTION INTRAMUSCULAR; INTRAVENOUS at 09:00

## 2018-12-15 RX ADMIN — TAMSULOSIN HYDROCHLORIDE SCH MG: 0.4 CAPSULE ORAL at 20:36

## 2018-12-15 RX ADMIN — ASPIRIN 81 MG CHEWABLE TABLET SCH MG: 81 TABLET CHEWABLE at 09:00

## 2018-12-15 RX ADMIN — CARVEDILOL SCH MG: 12.5 TABLET, FILM COATED ORAL at 09:01

## 2018-12-15 NOTE — DIAGNOSTIC IMAGING REPORT
EXAMINATION:  CHEST SINGLE (PORTABLE)    



INDICATION:      CHF.



COMPARISON:  Chest x-ray 12/14/2018. 12/13/2018.

     

FINDINGS:  AP view   



TUBES and LINES:  Left-sided ICD with 3 intact wires.



LUNGS:  Lungs are well inflated.  There are bibasilar atelectasis. There is

mild prominence of the central pulmonary vasculature, consistent with pulmonary

venous congestion.



PLEURA:  No pleural effusion or pneumothorax.



HEART AND MEDIASTINUM:  Cardiac size is moderately enlarged. There are

atherosclerotic calcifications within the aorta.



BONES AND SOFT TISSUES:  No acute osseous lesion.  Soft tissues are

unremarkable.



UPPER ABDOMEN: No free air under the diaphragm.    



IMPRESSION: 

Moderate cardiomegaly with central pulmonary venous congestion.





Signed by: Dr. Stephen Granda M.D. on 12/15/2018 10:05 AM

## 2018-12-15 NOTE — PROGRESS NOTE
DATE:  December 14, 2018 



INTERNAL MEDICINE PROGRESS NOTE



COVERAGE FOR:  Dr. Raul Solitario. 



SUBJECTIVE:  Mr. Neves was seen and examined at the bedside.  He is 

walking independently.  He is eating well.  Chest x-ray shows improved 

fluid overload.  Two liters per minute by nasal cannula, 99% oxygen 

saturation. 



REVIEW OF SYSTEMS:  No headaches, no rash. 



OBJECTIVE 

VITAL SIGNS:  Afebrile.  Vital signs noted per electronic record. 

GENERAL:  In no acute distress, alert and calm, slightly weak. 

HEENT:  Normocephalic, atraumatic. 

NECK:  Supple.  Throat midline. 

LUNGS:  Bilateral air entry is decreased, rare rhonchi. 

CARDIOVASCULAR:  S1 and S2.  No murmurs, rubs, or gallops. 

ABDOMEN:  Soft and nontender. 

EXTREMITIES:  No clubbing or cyanosis, still 1 to 2+ edema. 

INTEGUMENT:  No rash or purpura. 



LABORATORY DATA:  BUN 27 and creatinine 1.3.  White count 13 and hematocrit 

27. 



IMAGING STUDIES:  Chest x-rays shows improved fluid overload. 



IMPRESSION 

1. Influenza A with exacerbation.

2. Fluid overload, chronic systolic congestive heart failure with acute 

flare. 

3. Hypokalemia. 

4. Chronic smoker. 

5. Former alcohol. 

6. Poor adherence to medications. 

7. Chronic obstructive pulmonary disease, chronic hypoxemia. 



PLAN:  Continue Tamiflu.  Mobilize the patient further.  Continue 

diuretics.  The patient will be arranged for home health given poor 

adherence.  We will see if he qualifies for oxygen.  He has 85% oxygen 

saturation on room air and _____. 









DD:  12/15/2018 06:21

DT:  12/15/2018 06:50

Job#:  [id number] BALWINDER

## 2018-12-16 VITALS — SYSTOLIC BLOOD PRESSURE: 115 MMHG | DIASTOLIC BLOOD PRESSURE: 50 MMHG

## 2018-12-16 VITALS — DIASTOLIC BLOOD PRESSURE: 53 MMHG | SYSTOLIC BLOOD PRESSURE: 118 MMHG

## 2018-12-16 VITALS — DIASTOLIC BLOOD PRESSURE: 51 MMHG | SYSTOLIC BLOOD PRESSURE: 117 MMHG

## 2018-12-16 VITALS — DIASTOLIC BLOOD PRESSURE: 68 MMHG | SYSTOLIC BLOOD PRESSURE: 148 MMHG

## 2018-12-16 VITALS — SYSTOLIC BLOOD PRESSURE: 141 MMHG | DIASTOLIC BLOOD PRESSURE: 64 MMHG

## 2018-12-16 VITALS — DIASTOLIC BLOOD PRESSURE: 56 MMHG | SYSTOLIC BLOOD PRESSURE: 120 MMHG

## 2018-12-16 LAB
ANION GAP SERPL CALC-SCNC: 13.3 MMOL/L (ref 8–16)
BASOPHILS # BLD AUTO: 0.1 10*3/UL (ref 0–0.1)
BASOPHILS NFR BLD AUTO: 0.8 % (ref 0–1)
BUN SERPL-MCNC: 29 MG/DL (ref 7–26)
BUN/CREAT SERPL: 28 (ref 6–25)
CALCIUM SERPL-MCNC: 9.4 MG/DL (ref 8.4–10.2)
CHLORIDE SERPL-SCNC: 97 MMOL/L (ref 98–107)
CO2 SERPL-SCNC: 35 MMOL/L (ref 22–29)
DEPRECATED NEUTROPHILS # BLD AUTO: 8.3 10*3/UL (ref 2.1–6.9)
EGFRCR SERPLBLD CKD-EPI 2021: > 60 ML/MIN (ref 60–?)
EOSINOPHIL # BLD AUTO: 0.4 10*3/UL (ref 0–0.4)
EOSINOPHIL NFR BLD AUTO: 3.5 % (ref 0–6)
ERYTHROCYTE [DISTWIDTH] IN CORD BLOOD: 19.1 % (ref 11.7–14.4)
GLUCOSE SERPLBLD-MCNC: 122 MG/DL (ref 74–118)
HCT VFR BLD AUTO: 27.1 % (ref 38.2–49.6)
HGB BLD-MCNC: 8 G/DL (ref 14–18)
LYMPHOCYTES # BLD: 2.1 10*3/UL (ref 1–3.2)
LYMPHOCYTES NFR BLD AUTO: 17 % (ref 18–39.1)
MAGNESIUM SERPL-MCNC: 2.1 MG/DL (ref 1.3–2.1)
MCH RBC QN AUTO: 22.4 PG (ref 28–32)
MCHC RBC AUTO-ENTMCNC: 29.5 G/DL (ref 31–35)
MCV RBC AUTO: 75.9 FL (ref 81–99)
MONOCYTES # BLD AUTO: 1.2 10*3/UL (ref 0.2–0.8)
MONOCYTES NFR BLD AUTO: 10 % (ref 4.4–11.3)
NEUTS SEG NFR BLD AUTO: 68.1 % (ref 38.7–80)
PLATELET # BLD AUTO: 286 X10E3/UL (ref 140–360)
POTASSIUM SERPL-SCNC: 4.3 MMOL/L (ref 3.5–5.1)
RBC # BLD AUTO: 3.57 X10E6/UL (ref 4.3–5.7)
SODIUM SERPL-SCNC: 141 MMOL/L (ref 136–145)

## 2018-12-16 RX ADMIN — CARVEDILOL SCH MG: 12.5 TABLET, FILM COATED ORAL at 09:27

## 2018-12-16 RX ADMIN — ASPIRIN 81 MG CHEWABLE TABLET SCH MG: 81 TABLET CHEWABLE at 09:27

## 2018-12-16 RX ADMIN — FUROSEMIDE SCH MG: 10 INJECTION, SOLUTION INTRAMUSCULAR; INTRAVENOUS at 20:03

## 2018-12-16 RX ADMIN — LISINOPRIL SCH MG: 10 TABLET ORAL at 09:27

## 2018-12-16 RX ADMIN — FUROSEMIDE SCH MG: 10 INJECTION, SOLUTION INTRAMUSCULAR; INTRAVENOUS at 09:27

## 2018-12-16 RX ADMIN — APIXABAN SCH MG: 5 TABLET, FILM COATED ORAL at 09:27

## 2018-12-16 RX ADMIN — TAMSULOSIN HYDROCHLORIDE SCH MG: 0.4 CAPSULE ORAL at 20:03

## 2018-12-16 RX ADMIN — SPIRONOLACTONE SCH MG: 25 TABLET, FILM COATED ORAL at 09:27

## 2018-12-16 RX ADMIN — CARVEDILOL SCH MG: 12.5 TABLET, FILM COATED ORAL at 20:03

## 2018-12-16 RX ADMIN — APIXABAN SCH MG: 5 TABLET, FILM COATED ORAL at 17:43

## 2018-12-16 NOTE — DIAGNOSTIC IMAGING REPORT
EXAMINATION:  CHEST SINGLE (PORTABLE)    



INDICATION: chf



COMPARISON:  12/15/2018

     

FINDINGS:  AP view   



TUBES and LINES:  Left chest wall ICD.



LUNGS:  Lungs are moderately inflated. Stable central pulmonary venous

congestion.



PLEURA:  No pleural effusion or pneumothorax.



HEART AND MEDIASTINUM: Stable moderate enlargement of the cardiac silhouette.

Aortic calcifications.



BONES AND SOFT TISSUES:  No acute osseous lesion.  Median sternotomy wires.

Soft tissues are unremarkable.



UPPER ABDOMEN: No free air under the diaphragm.    



IMPRESSION: 

Stable cardiomegaly and central pulmonary venous congestion.



Signed by: DR. Ken Vences MD on 12/16/2018 6:45 AM

## 2018-12-16 NOTE — PROGRESS NOTE
DATE:  December 16, 2018



INTERNAL MEDICINE PROGRESS NOTE



COVERAGE FOR:  Dr. Solitario.



SUBJECTIVE:  Mr. Neves was seen and examined at bedside.  He is on 3 

liters per minute by nasal cannula.  He is eating well.  He is having bowel 

movements.  He was not able to get weaned further off the oxygen yesterday. 

 He is able to move around more spontaneously now.



REVIEW OF SYSTEMS:  No headaches.  No double vision.



OBJECTIVE

VITAL SIGNS:  Afebrile.  Vital signs noted per electronic record.

GENERAL:  In no acute distress, alert and calm.

HEENT:  Normocephalic, atraumatic.

NECK:  Supple.  Throat midline.

LUNGS:  Bilateral air entry with decreased breath sounds.  Small rhonchi.

CARDIOVASCULAR:  S1, S2.  No murmurs, rubs, or gallops.

ABDOMEN:  Soft, obese.

EXTREMITIES:  No clubbing.  No cyanosis.  No edema.

INTEGUMENT:  No rash or purpura.



LABORATORY DATA:  Potassium 4.3, BUN 29, creatinine 1.0, white count 12, 

hematocrit 27.



Chest x-ray continues to show stable mild pulmonary venous congestion.



IMPRESSION

1. Influenza with exacerbation.

2. Pneumonia.

3. Fluid overload.

4. Acute-on-chronic systolic heart failure with exacerbation.

5. Active smoker, suspect underlying chronic obstructive pulmonary 

disease.

6. Hypoxemia.

7. Former alcohol dependency.



PLAN:  Continued smoking cessation is highly recommended.  I talked to him 

about this.  Continue oxygen.  As he is very fragile, we wish to allow him 

to go home only when oxygen has arrived.  We are arranging home oxygen with 

his insurance company.  Continue to promote activity and mobilization.  

Continue cardiac medications.









DD:  12/16/2018 15:51

DT:  12/16/2018 16:15

Job#:  [id number] LPA

## 2018-12-16 NOTE — PROGRESS NOTE
DATE:  December 15, 2018 



INTERNAL MEDICINE PROGRESS NOTE



COVERAGE FOR:  Dr. Raul Solitario. 



SUBJECTIVE:  Mr. Neves was seen and examined at the bedside.  He has 85% 

saturation.  He was on room air with FiO2.  He was at rest during this.  

Echo was reported with EF less than 20%.  He was unable to acquire oxygen 

yesterday. 



REVIEW OF SYSTEMS:  No headaches, no bleeding. 



OBJECTIVE 

VITAL SIGNS:  Afebrile.  Vital signs noted per electronic record. 

GENERAL:  In no acute distress, alert and calm, in bed, looks little bit 

weak. 

HEENT:  Normocephalic, atraumatic. 

NECK:  Supple.  Throat midline. 

LUNGS:  Bilateral air entry with decreased breath sounds. 

CARDIOVASCULAR:  No murmurs.  No rubs. 

ABDOMEN:  Soft, nontender, and obese. 

EXTREMITIES:  No clubbing.  No cyanosis.  There is 1 to 2+ edema. 

INTEGUMENT:  No rash or purpura. 



LABORATORY DATA:  No new updates today. 



IMPRESSION 

1. Influenza A, acute. 

2. Fluid overload, acute-on-chronic systolic congestive heart failure. 

3. Chronic smoker, possible chronic obstructive pulmonary disease with 

exacerbation. 

4. Chronic hypoxemia. 

5. Former alcohol dependency. 

6. Poor adherence to medications. 



PLAN:  We are going to have to wait out for the home oxygen to be acquired. 

 Patient is very fragile at baseline.  Continue diuretics.  Recheck blood 

work in the morning and ensure potassium is adequate.  Continue to mobilize 

him as possible. 









DD:  12/16/2018 03:01

DT:  12/16/2018 04:22

Job#:  [id number] ANANDA

## 2018-12-17 VITALS — SYSTOLIC BLOOD PRESSURE: 130 MMHG | DIASTOLIC BLOOD PRESSURE: 60 MMHG

## 2018-12-17 VITALS — DIASTOLIC BLOOD PRESSURE: 57 MMHG | SYSTOLIC BLOOD PRESSURE: 115 MMHG

## 2018-12-17 VITALS — SYSTOLIC BLOOD PRESSURE: 112 MMHG | DIASTOLIC BLOOD PRESSURE: 53 MMHG

## 2018-12-17 VITALS — DIASTOLIC BLOOD PRESSURE: 58 MMHG | SYSTOLIC BLOOD PRESSURE: 113 MMHG

## 2018-12-17 RX ADMIN — FUROSEMIDE SCH MG: 10 INJECTION, SOLUTION INTRAMUSCULAR; INTRAVENOUS at 09:58

## 2018-12-17 RX ADMIN — PANTOPRAZOLE SODIUM SCH MG: 40 TABLET, DELAYED RELEASE ORAL at 07:30

## 2018-12-17 RX ADMIN — LISINOPRIL SCH MG: 10 TABLET ORAL at 09:58

## 2018-12-17 RX ADMIN — ASPIRIN 81 MG CHEWABLE TABLET SCH MG: 81 TABLET CHEWABLE at 09:58

## 2018-12-17 RX ADMIN — CARVEDILOL SCH MG: 12.5 TABLET, FILM COATED ORAL at 09:58

## 2018-12-17 RX ADMIN — APIXABAN SCH MG: 5 TABLET, FILM COATED ORAL at 09:58

## 2018-12-17 RX ADMIN — SPIRONOLACTONE SCH MG: 25 TABLET, FILM COATED ORAL at 09:58

## 2018-12-17 NOTE — DISCHARGE SUMMARY
PRIMARY CARE DOCTOR:  Dr. Liz Amin.



FINAL DIAGNOSIS:  Acute systolic congestive heart failure with ejection 

fraction of less than 20%.



SECONDARY DIAGNOSES

1. Chronic respiratory failure, home oxygen arranged.

2. Likely chronic obstructive pulmonary disease.

3. Atrial fibrillation, on Eliquis.

4. Iron deficiency anemia, status post intravenous iron.

5. Influenza, status post Tamiflu x5 days. 



CONSULTANTS:  None.



PROCEDURES/STUDIES PERFORMED:  Echocardiogram. 



HISTORY:  Per H&P.



HOSPITAL COURSE:  Patient was diuresed with IV Lasix. He already has an 

AICD. Patient will continue Coreg and Aldactone for his CHF. Home oxygen 

has been arranged for his chronic respiratory failure. I have notified his 

primary care doctor about this, and patient will follow up with him in 1 

week. Patient was continued on his Eliquis for his atrial fibrillation. 

Patient will also need colon cancer screening for iron deficiency anemia if 

not done already. Again this was related to his primary care doctor. 

Patient was seen and examined today. It took 32 minutes total to discharge 

this patient.



CONDITION ON DISCHARGE:  Improved.





DISCHARGE MEDICATIONS:  Please see medication reconciliation form.





 



 _________________________________

ELDA GLOVER M.D.



DD:  12/17/2018 11:19

DT:  12/17/2018 11:27

Job#:  [id number]

cc:LIZ AMIN M.D.

## 2019-01-09 ENCOUNTER — HOSPITAL ENCOUNTER (EMERGENCY)
Dept: HOSPITAL 88 - ER | Age: 79
Discharge: OTHER INSTITUTION | End: 2019-01-09
Payer: MEDICARE

## 2019-01-09 VITALS — HEIGHT: 78 IN | WEIGHT: 173 LBS | BODY MASS INDEX: 20.02 KG/M2

## 2019-01-09 DIAGNOSIS — E11.9: ICD-10-CM

## 2019-01-09 DIAGNOSIS — Z87.891: ICD-10-CM

## 2019-01-09 DIAGNOSIS — R06.09: Primary | ICD-10-CM

## 2019-01-09 DIAGNOSIS — I50.9: ICD-10-CM

## 2019-01-09 DIAGNOSIS — I10: ICD-10-CM

## 2019-01-09 LAB
ALBUMIN SERPL-MCNC: 3.4 G/DL (ref 3.5–5)
ALBUMIN/GLOB SERPL: 1.1 {RATIO} (ref 0.8–2)
ALP SERPL-CCNC: 93 IU/L (ref 40–150)
ALT SERPL-CCNC: 7 IU/L (ref 0–55)
ANION GAP SERPL CALC-SCNC: 15.6 MMOL/L (ref 8–16)
BASOPHILS # BLD AUTO: 0.1 10*3/UL (ref 0–0.1)
BASOPHILS NFR BLD AUTO: 1 % (ref 0–1)
BUN SERPL-MCNC: 20 MG/DL (ref 7–26)
BUN/CREAT SERPL: 13 (ref 6–25)
CALCIUM SERPL-MCNC: 9 MG/DL (ref 8.4–10.2)
CHLORIDE SERPL-SCNC: 102 MMOL/L (ref 98–107)
CK MB SERPL-MCNC: 3.7 NG/ML (ref 0–5)
CK SERPL-CCNC: 106 IU/L (ref 30–200)
CO2 SERPL-SCNC: 25 MMOL/L (ref 22–29)
DEPRECATED INR PLAS: 1.08
DEPRECATED NEUTROPHILS # BLD AUTO: 6.3 10*3/UL (ref 2.1–6.9)
EGFRCR SERPLBLD CKD-EPI 2021: 44 ML/MIN (ref 60–?)
EOSINOPHIL # BLD AUTO: 0.2 10*3/UL (ref 0–0.4)
EOSINOPHIL NFR BLD AUTO: 2.3 % (ref 0–6)
ERYTHROCYTE [DISTWIDTH] IN CORD BLOOD: 20.8 % (ref 11.7–14.4)
GLOBULIN PLAS-MCNC: 3 G/DL (ref 2.3–3.5)
GLUCOSE SERPLBLD-MCNC: 91 MG/DL (ref 74–118)
HCT VFR BLD AUTO: 29.1 % (ref 38.2–49.6)
HGB BLD-MCNC: 8.5 G/DL (ref 14–18)
LYMPHOCYTES # BLD: 1.4 10*3/UL (ref 1–3.2)
LYMPHOCYTES NFR BLD AUTO: 15.2 % (ref 18–39.1)
MCH RBC QN AUTO: 22.3 PG (ref 28–32)
MCHC RBC AUTO-ENTMCNC: 29.2 G/DL (ref 31–35)
MCV RBC AUTO: 76.4 FL (ref 81–99)
MONOCYTES # BLD AUTO: 1.1 10*3/UL (ref 0.2–0.8)
MONOCYTES NFR BLD AUTO: 11.6 % (ref 4.4–11.3)
NEUTS SEG NFR BLD AUTO: 69.5 % (ref 38.7–80)
PLATELET # BLD AUTO: 320 X10E3/UL (ref 140–360)
POTASSIUM SERPL-SCNC: 4.6 MMOL/L (ref 3.5–5.1)
PROTHROMBIN TIME: 15 SECONDS (ref 11.9–14.5)
RBC # BLD AUTO: 3.81 X10E6/UL (ref 4.3–5.7)
SODIUM SERPL-SCNC: 138 MMOL/L (ref 136–145)

## 2019-01-09 PROCEDURE — 71045 X-RAY EXAM CHEST 1 VIEW: CPT

## 2019-01-09 PROCEDURE — 85025 COMPLETE CBC W/AUTO DIFF WBC: CPT

## 2019-01-09 PROCEDURE — 99284 EMERGENCY DEPT VISIT MOD MDM: CPT

## 2019-01-09 PROCEDURE — 82553 CREATINE MB FRACTION: CPT

## 2019-01-09 PROCEDURE — 93926 LOWER EXTREMITY STUDY: CPT

## 2019-01-09 PROCEDURE — 83605 ASSAY OF LACTIC ACID: CPT

## 2019-01-09 PROCEDURE — 87040 BLOOD CULTURE FOR BACTERIA: CPT

## 2019-01-09 PROCEDURE — 84484 ASSAY OF TROPONIN QUANT: CPT

## 2019-01-09 PROCEDURE — 36415 COLL VENOUS BLD VENIPUNCTURE: CPT

## 2019-01-09 PROCEDURE — 93971 EXTREMITY STUDY: CPT

## 2019-01-09 PROCEDURE — 85610 PROTHROMBIN TIME: CPT

## 2019-01-09 PROCEDURE — 82550 ASSAY OF CK (CPK): CPT

## 2019-01-09 PROCEDURE — 83880 ASSAY OF NATRIURETIC PEPTIDE: CPT

## 2019-01-09 PROCEDURE — 80053 COMPREHEN METABOLIC PANEL: CPT

## 2019-01-09 NOTE — XMS REPORT
Clinical Summary

                             Created on: 2019



Jerry Neves

External Reference #: [id number]

: 1940

Sex: Male



Demographics







                          Address                   [address]

[place]  [address]

 

                          Home Phone                [phone]

 

                          Preferred Language        English

 

                          Marital Status            Unknown

 

                          Muslim Affiliation     Episcopal

 

                          Race                      Unknown

 

                          Ethnic Group              /Latin





Author







                          Author                    [facility]

 

                          Address                   Unknown

 

                          Phone                     Unavailable







Support







                Name            Relationship    Address         Phone

 

                Giovana,Prima    [employer]            [place],       [phone]







Care Team Providers







                    Care Team Member Name    Role                Phone

 

                          PCP                       Unavailable







Allergies

No Known Allergies



Medications







                          End Date                  Status



              Medication     Sig          Dispensed     Refills      Start  



                                         Date  

 

                                                    Active



                     atorvastatin (LIPITOR) 20     Take 20 mg by       0   



                           MG tablet                 mouth daily.     

 

                                                    Active



                     carvedilol (COREG) 25 MG     Take 25 mg by       0   



                           tablet                    mouth 2 (two)     



                                         times daily     



                                         with     



                                         breakfast and     



                                         dinner.     

 

                                                    Active



                     amiodarone (PACERONE) 200     Take 100 mg         0   



                           MG tablet                 by mouth     



                                         daily .     

 

                                                    Active



                     ramipril (ALTACE) 2.5 MG     Take 10 mg by       0   



                           capsule                   mouth daily .     

 

                                                    Active



                     clopidogrel (PLAVIX) 75     Take 75 mg by       0   



                           mg tablet                 mouth daily.     

 

                                                    Active



                     gabapentin (NEURONTIN)     Take 300 mg         0   



                           300 MG capsule            by mouth     



                                         nightly.     

 

                                                    Active



                     metformin HCl (METFORMIN     Take 500 mg         0   



                           ORAL)                     by mouth     



                                         every     



                                         morning.     

 

                                                    Active



                     furosemide (LASIX) 20 MG     Take 20 mg by       0   



                           tablet                    mouth every     



                                         other day.     

 

                                                    Active



                     amLODIPine (NORVASC) 2.5     Take 2.5 mg         0   



                           MG tablet                 by mouth     



                                         daily.     

 

                                                    Active



                     tamsulosin (FLOMAX) 0.4     Take 0.4 mg         0   



                           mg Cp24 24 hr capsule     by mouth     



                                         nightly.     

 

                                                    Active



                     ergocalciferol (VITAMIN     Take 50,000         0   



                           D2) 50,000 unit capsule     Units by     



                                         mouth once a     



                                         week.     

 

                                                    Active



                     alendronate (FOSAMAX) 70     Take 70 mg by       0   



                           MG tablet                 mouth every 7     



                                         days Take in     



                                         the morning     



                                         with a full     



                                         glass of     



                                         water, on an     



                                         empty     



                                         stomach, and     



                                         do not take     



                                         anything else     



                                         by mouth or     



                                         lie down for     



                                         the next 30     



                                         min. .     

 

                                                    Active



                     BABY ASPIRIN ORAL     Take by mouth       0   



                                         daily.     

 

                          06/15/2018                Discontinued



                     cilostazol (PLETAL) 100     Take 100 mg         0   



                           MG tablet                 by mouth 2     



                                         (two) times     



                                         daily.     

 

                          06/15/2018                Discontinued



                     spironolactone      Take 25 mg by       0   



                           (ALDACTONE) 25 MG tablet     mouth daily.     

 

                          06/15/2018                Discontinued



                     warfarin (COUMADIN) 2 MG     Take 2 mg by        0   



                           tablet                    mouth daily.     







Active Problems







 



                           Problem                   Noted Date

 

 



                           Claudication              06/15/2018

 

 



                           Abnormal stress test      06/15/2018

 

 



                           PAD (peripheral artery disease)     06/15/2018

 

 



                           Pneumonia                 2013

 

 



                                         Hypertension 

 

 



                                         CHF (congestive heart failure) 

 

 



                                         Mixed hyperlipidemia 

 

 



                                         CAD (coronary artery disease) 

 

 



                                         PVD (peripheral vascular disease) 

 

 



                                         Diabetes mellitus 

 

 



                                         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 

 

 



                                         Overview:



                                         Updated by SNOMED/IMO







Encounters







                          Care Team                 Description



                     Date                Type                Specialty  

 

                                        



Doe Belle MD              



                     2019          Outside Orders      Cardiology  

 

                                        



Santos Li MD                     L CATH & CORONARY ANGIOS



                           06/15/2018                Surgery   

 

                                        



Santos Li MD                     Claudication (HCC) (Primary Dx); 

Abnormal stress test; 

Coronary artery disease due to lipid rich plaque



                     06/15/2018          Hospital            Cardiology  



                           -                         Encounter   



                                         2018    



after 2018



Social History







                                        Date



                 Tobacco Use     Types           Packs/Day       Years Used 

 

                                        Quit: 2018



                           Former Smoker             0.25  

 

    



                                         Smokeless Tobacco: Never   



                                         Used   









                                        Tobacco Cessation: Ready to Quit: No; Counseling Given: Yes











   



                 Alcohol Use     Drinks/Week     oz/Week         Comments

 

   



                                         No   









 



                           Sex Assigned at Birth     Date Recorded

 

 



                                         Not on file 









                                        Industry



                           Job Start Date            Occupation 

 

                                        Not on file



                           Not on file               Not on file 









                                        Travel End



                           Travel History            Travel Start 

 





                                         No recent travel history available.







Last Filed Vital Signs







                                        Time Taken



                           Vital Sign                Reading 

 

                                        2018  7:20 AM CDT



                           Blood Pressure            141/63 

 

                                        2018  7:20 AM CDT



                           Pulse                     60 

 

                                        2018  7:20 AM CDT



                           Temperature               36.7 C (98 F) 

 

                                        2018  7:20 AM CDT



                           Respiratory Rate          20 

 

                                        2018  7:20 AM CDT



                           Oxygen Saturation         97% 

 

                                        -



                           Inhaled Oxygen            - 



                                         Concentration  

 

                                        2018  7:20 AM CDT



                           Weight                    77.6 kg (171 lb) 

 

                                        06/15/2018  7:00 AM CDT



                           Height                    172.7 cm (5' 8") 

 

                                        2018  7:20 AM CDT



                           Body Mass Index           26 







Plan of Treatment







                          Care Team                 Description



                     Date                Type                Specialty  

 

                                        



Doe Belle MD



[address]



[facility] [phone]



[place] [address]



[phone] (Fax)                       



                     01/10/2019          Office Visit        Cardiology  







Implants







                    Device Identifier    Shelf Expiration Date    Model / Serial / Lot



                 Implanted       Type            Area            Manufactur   



                                         er   

 

                    [id number]      2019          [id number] /

 /

                                        [id number]



                 Synergy         Stents-Cor      N/A: Coronary     [place]   



                     Implanted: Qty: 1 on 06/15/2018 by     Santos Mahoney MD      

 

                    [id number]      2019          [id number] /

 /

                                        [id number]



                 Promus Premier     Stents-Cor      N/A: Coronary     [place]   



                     Implanted: Qty: 1 on 06/15/2018 by     Santos Mahoney MD      







Procedures







                                        Comments



                 Procedure Name     Priority        Date/Time       Associated Diagnosis 

 

                                         



                           VASCULAR DIAGRAM -SCAN      2018  



                                         12:00 PM CDT  

 

                                         



                           VASCULAR DIAGRAM -SCAN      2018  



                                         12:00 PM CDT  

 

                                         



                           CARDIAC CATH REPORT -      2018  



                           SCAN                      10:12 AM CDT  

 

                                         



                           RHYTHM STRIP - SCAN       2018  



                                         12:01 PM CDT  

 

                                        



Results for this procedure are in the results section.



                     CBC (HEMOGRAM ONLY)     Routine             2018  



                                         3:10 AM CDT  

 

                                        



Results for this procedure are in the results section.



                     BASIC METABOLIC PANEL (7)     Routine             2018  



                                         3:10 AM CDT  

 

                                        



Results for this procedure are in the results section.



                     POCT-ACT            Routine             06/15/2018  



                                         5:46 PM CDT  

 

                                        



Results for this procedure are in the results section.



                     POCT-ACT            Routine             06/15/2018  



                                         3:08 PM CDT  

 

                                        



Results for this procedure are in the results section.



                     POCT-ACT            Routine             06/15/2018  



                                         2:39 PM CDT  

 

                                        



Results for this procedure are in the results section.



                     POCT-ACT            Routine             06/15/2018  



                                         2:02 PM CDT  

 

                                         



                     ABD AO & LOWER EXT      06/15/2018          Peripheral vascular 



                     ANGIOS/ POSS PPI      1:22 PM CDT         disease (HCC) 



                                         CAD in native artery 

 

  



                                         Case Notes



                                         4CASE



                                         POP6  Left



                                         heart cath



                                         possiblelo



                                         wer



                                         extremity



                                         angio with



                                         possible



                                         ppi

 

                                         



                     L CATH & CORONARY ANGIOS      06/15/2018          Peripheral vascular 



                           1:22 PM CDT               disease (HCC) 



                                         CAD in native artery 

 

  



                                         Case Notes



                                         4CASE



                                         POP6  Left



                                         heart cath



                                         possiblelo



                                         wer



                                         extremity



                                         angio with



                                         possible



                                         ppi

 

                                        



Results for this procedure are in the results section.



                     POCT-GLUCOSE METER     Routine             06/15/2018  



                                         8:18 AM CDT  



after 2018



Results

* VASCULAR DIAGRAM -SCAN (2018 12:00 PM CDT)



Only the most recent of 2 results within the time period is included.





 



                           Narrative                 Performed At

 

 



                                         This result has an attachment that is not available. 





* CARDIAC CATH REPORT - SCAN (2018 10:12 AM CDT)





 



                           Narrative                 Performed At

 

 



                                         This result has an attachment that is not available. 





* RHYTHM STRIP - SCAN (2018 12:01 PM CDT)





 



                           Narrative                 Performed At

 

 



                                         This result has an attachment that is not available. 





* CBC (Hemogram only) (2018  3:10 AM CDT)





   



                 Component       Value           Ref Range       Performed At

 

   



                 WBC             16.8 (H)        3.5 - 10.5 K/L     [facility]

 

   



                 RBC             3.92 (L)        4.63 - 6.08 M/L     [facility]

 

   



                 Hemoglobin      12.4 (L)        13.7 - 17.5 GM/DL     [facility]

 

   



                 Hematocrit      37.7 (L)        40.1 - 51.0 %     [facility]

 

   



                 MCV             96.2 (H)        79.0 - 92.2 fL     [facility]

 

   



                 MCH             31.6            25.7 - 32.2 pg     [facility]

 

   



                 MCHC            32.9            32.3 - 36.5 GM/DL     [facility]

 

   



                 RDW             14.6 (H)        11.6 - 14.4 %     [facility]

 

   



                 Platelets       281             150 - 450 K/CU MM     [facility]

 

   



                 MPV             11.2            9.4 - 12.4 fL     [facility]

 

   



                 nRBC            0               0 - 0 /100 WBC     [facility]













                                         Specimen

 





                                         Blood









   



                 Performing Organization     Address         City/State/Zipcode     Phone Number

 

   



                 [facility]     [address] [place] [address] [phone]





                                         [facility]   





* Basic metabolic panel (2018  3:10 AM CDT)





   



                 Component       Value           Ref Range       Performed At

 

   



                 Sodium          139             136 - 145 meq/L     [facility]

 

   



                 Potassium       4.5             3.5 - 5.1 meq/L     [facility]

 

   



                 Chloride        108 (H)         98 - 107 meq/L     [facility]

 

   



                 CO2             19 (L)          22 - 29 meq/L     [facility]

 

   



                 BUN             23 (H)          7 - 21 mg/dL     [facility]

 

   



                 Creatinine      1.10            0.57 - 1.25 mg/dL     [facility]

 

   



                 Glucose         176 (H)         70 - 105 mg/dL     [facility]

 

   



                 Calcium         9.0             8.4 - 10.2 mg/dL     [facility]

 

   



                 EGFR            65Comment: ESTIMATED GFR IS     mL/min/1.73 sq m     [facility]



                           NOT AS ACCURATE AS CREATININE      [facility]



                                         CLEARANCE IN PREDICTING  



                                         GLOMERULAR FILTRATION RATE.  



                                         ESTIMATED GFR IS NOT  



                                         APPLICABLE FOR DIALYSIS  



                                         PATIENTS.  













                                         Specimen

 





                                         Blood









   



                 Performing Organization     Address         City[facility]     Phone Number

 

   



                 [address]   





* POC ACTIVATED CLOTTING TIME (06/15/2018  5:46 PM CDT)



Only the most recent of 4 results within the time period is included.





   



                 Component       Value           Ref Range       Performed At

 

   



                 Activated Clotting Time     164Comment: TESTED AT [facility]     sec             [address]













                                         Specimen

 





                                         Blood









   



                 Performing Organization     Address         City[facility]State[facility]     Phone Number

 

   



                 [address] [place], [place] [phone]   





* POC-Glucose meter (06/15/2018  8:18 AM CDT)





   



                 Component       Value           Ref Range       Performed At

 

   



                 POC-Glucose Meter     118 (H)Comment: TESTED AT     70 - 110 mg/dL     [address]



                                         [id number]  













                                         Specimen

 





                                         Blood









   



                 Performing Organization     Address         City/[facility]/Zipcode     Phone Number

 

   



                 [address]   





after 2018



Insurance







     



            Payer      Benefit     Subscriber ID     Type       Phone      Address



                                         Plan /    



                                         Group    

 

     



                     [employer]          xxxxxxxxxxx   



                                         MEDICARE    



                                         ADV    









     



            Guarantor Name     Account     Relation to     Date of     Phone      Billing Address



                     Type                Patient             Birth  

 

     



            Neves,Jerry Cason     Personal/F     Self       1940     [phone]     [address] [place] [address]               (Home)              [place] [address]







Advance Directives





For more information, please contact:



[facility]



[address]



[place] [address] [phone]









                          Date Inactivated          Comments



                           Code Status               Date Activated  

 

                          2018 12:37 PM         



                           Full Code                 6/15/2018  7:55 AM  









  



                           This code status was determined by:     Patient 









                                                     

 

                          7/15/2013  4:53 PM        All possible means of support, including: cardiac massage,

 mechanical ventilation, and defibrillation will be used to support life.





                           Code ONE                  2013  1:11 AM

## 2019-01-09 NOTE — NUR
JENNIFER FROM LAB CALLED TO REPORT ELEVATED LACTIC ACID 33.3. INFORMED DR. LECHUGA AND PRIMARY NURSE NEENA BLOOM OF THIS.

## 2019-01-09 NOTE — DIAGNOSTIC IMAGING REPORT
EXAMINATION:  CHEST SINGLE (PORTABLE)    



INDICATION: SOB



COMPARISON:  12/15/2018

     

FINDINGS:  AP view   



TUBES and LINES:  Left chest wall ICD.



LUNGS:  Bilateral central pulmonary venous congestion.



PLEURA: Small left pleural effusion.  No pneumothorax.



HEART AND MEDIASTINUM: Stable moderate enlargement of the cardiac silhouette.

Aortic calcifications.



BONES AND SOFT TISSUES:  No acute osseous lesion.  Median sternotomy wires.

Soft tissues are unremarkable.



UPPER ABDOMEN: No free air under the diaphragm. 



IMPRESSION: 



Decompensated CHF; central pulmonary venous congestion. 





Signed by: Dr. DARRYL Bahena M.D. on 1/9/2019 6:07 PM

## 2019-01-09 NOTE — NUR
PT STATES HE WAS SENT TO ED FOR FOLLOW UP ON RESULTS OF PRIOR EXAMS REVEALING 
"CLOT IN RIGHT LEG". NO ACUTE DISTRESS NOTED AT THIS TIME, PT DENIES CHEST PAIN 
OR ANY DISCOMFORT AT THIS TIME. ALSO STATES HE HAS AN APPOINTMENT TOMORROW AT 
[facility] [place] [facility] TO "DILATE THE VEIN WITH THE CLOT". EDEMA IS NOTED TO THE RLE. PT MADE 
AWARE OF ORDERS AND PENDING EXAMS, VERBALIZED UNDERSTANDING.

## 2019-01-09 NOTE — NUR
PT UPDATED ON PLAN OF CARE, AND PLAN TO TRANSFER TO [facility] FOR CONTINUITY OF CARE.